# Patient Record
Sex: MALE | Race: WHITE | NOT HISPANIC OR LATINO | Employment: OTHER | URBAN - METROPOLITAN AREA
[De-identification: names, ages, dates, MRNs, and addresses within clinical notes are randomized per-mention and may not be internally consistent; named-entity substitution may affect disease eponyms.]

---

## 2017-12-01 ENCOUNTER — ALLIED HEALTH/NURSE VISIT (OUTPATIENT)
Dept: NURSING | Facility: CLINIC | Age: 72
End: 2017-12-01
Payer: MEDICARE

## 2017-12-01 VITALS
DIASTOLIC BLOOD PRESSURE: 100 MMHG | BODY MASS INDEX: 23.34 KG/M2 | SYSTOLIC BLOOD PRESSURE: 192 MMHG | WEIGHT: 163 LBS | HEIGHT: 70 IN

## 2017-12-01 DIAGNOSIS — Z53.9 ERRONEOUS ENCOUNTER--DISREGARD: Primary | ICD-10-CM

## 2019-11-04 ENCOUNTER — TELEPHONE (OUTPATIENT)
Dept: RADIATION ONCOLOGY | Facility: CLINIC | Age: 74
End: 2019-11-04

## 2019-11-04 NOTE — TELEPHONE ENCOUNTER
Radiation Oncology Intake: New Consult Screening        REFERRAL INFORMATION:   Scheduled by, Callback #: Patient    Diagnosis: Sarcoma R Thigh   Referring provider:  Self  (BMT) Protocol #:     Have you had previous Radiation Therapy with us?: No  Have you had previous Radiation Therapy elsewhere?: No  Is there a specific Radiation Oncologist requested?: No     Are you okay with traveling to our location daily to receive treatment(BMT: YES): Yes  Are there records pertaining to this diagnosis from external facilities?: Yes   Where: [facility]; Request for Pathology and Imaging Sent 10:53 AM 11/4/2019      Previous Radiation Therapy(Y/N; location):  no       Previous Chemotherap(Y/N; location): no     Biopsy(Y/N; location): yes, [facility]    Surgery(Y/N): Yes, [facility]        Imaging: Yes    Previous Testing/Procedures  Previous Genetic Counseling(Y/N; Location):   (Breast)Oncotype:    (Prostate)PSA:    (H&N)Dental:    PFT:     ADDITIONAL INFORMATION:  Special Needs:   Other:

## 2019-11-06 ENCOUNTER — OFFICE VISIT (OUTPATIENT)
Dept: RADIATION ONCOLOGY | Facility: CLINIC | Age: 74
End: 2019-11-06
Attending: RADIOLOGY
Payer: MEDICARE

## 2019-11-06 VITALS — BODY MASS INDEX: 23.68 KG/M2 | SYSTOLIC BLOOD PRESSURE: 181 MMHG | DIASTOLIC BLOOD PRESSURE: 89 MMHG | WEIGHT: 165 LBS

## 2019-11-06 DIAGNOSIS — C49.9 SARCOMA OF SOFT TISSUE (H): Primary | ICD-10-CM

## 2019-11-06 PROCEDURE — G0463 HOSPITAL OUTPT CLINIC VISIT: HCPCS | Performed by: RADIOLOGY

## 2019-11-06 ASSESSMENT — ENCOUNTER SYMPTOMS
EYE DISCHARGE: 0
INSOMNIA: 0
VOMITING: 0
EYE REDNESS: 0
PALPITATIONS: 0
NECK PAIN: 0
CONSTIPATION: 0
STRIDOR: 0
FREQUENCY: 1
NAUSEA: 0
COUGH: 0
BLURRED VISION: 0
BACK PAIN: 0
FEVER: 0
FOCAL WEAKNESS: 0
LOSS OF CONSCIOUSNESS: 0
ABDOMINAL PAIN: 0
DIZZINESS: 0
ORTHOPNEA: 0
DIAPHORESIS: 0
DIARRHEA: 0
WEIGHT LOSS: 0
SPEECH CHANGE: 0
TINGLING: 0
PHOTOPHOBIA: 0
BRUISES/BLEEDS EASILY: 0
WEAKNESS: 0
CHILLS: 0
DYSURIA: 0
DOUBLE VISION: 0
PND: 0
FLANK PAIN: 0
SHORTNESS OF BREATH: 1
HEMATURIA: 0
HEADACHES: 0
HALLUCINATIONS: 0
MEMORY LOSS: 0
DEPRESSION: 1
SEIZURES: 0
WHEEZING: 0
SENSORY CHANGE: 0
HEARTBURN: 0
HEMOPTYSIS: 0
POLYDIPSIA: 0
NERVOUS/ANXIOUS: 1
MYALGIAS: 0
FALLS: 0
SORE THROAT: 0
SPUTUM PRODUCTION: 0
BLOOD IN STOOL: 0
EYE PAIN: 0
TREMORS: 0
SINUS PAIN: 0
CLAUDICATION: 0

## 2019-11-06 ASSESSMENT — LIFESTYLE VARIABLES: SUBSTANCE_ABUSE: 0

## 2019-11-06 NOTE — LETTER
11/6/2019       RE: Carlo Pacheco  [address] [place] [address]     Dear Colleague,    Thank you for referring your patient, Carlo Pacheco, to the RAD[facility]ATION ONCOLOGY [facility]IC. Please see a copy of my visit note below.      HPI    INITIAL PATIENT ASSESSMENT    Diagnosis: Sarcoma    Prior radiation therapy: None    Prior chemotherapy: None    Prior hormonal therapy:No    Pain Eval:  Denies    Psychosocial  Living arrangements: Lives with wife  Fall Risk: independent   referral needs: Not needed    Advanced Directive: No  Implantable Cardiac Device? No      Nurse face-to-face time: Level 5:  over 15 min face to face time  Review of Systems   Constitutional: Positive for malaise/fatigue. Negative for chills, diaphoresis, fever and weight loss.   HENT: Negative for congestion, ear discharge, ear pain, hearing loss, nosebleeds, sinus pain, sore throat and tinnitus.    Eyes: Negative for blurred vision, double vision, photophobia, pain, discharge and redness.   Respiratory: Positive for shortness of breath. Negative for cough, hemoptysis, sputum production, wheezing and stridor.    Cardiovascular: Negative for chest pain, palpitations, orthopnea, claudication, leg swelling and PND.   Gastrointestinal: Negative for abdominal pain, blood in stool, constipation, diarrhea, heartburn, melena, nausea and vomiting.   Genitourinary: Positive for frequency. Negative for dysuria, flank pain, hematuria and urgency.   Musculoskeletal: Negative for back pain, falls, joint pain, myalgias and neck pain.   Skin: Negative for itching and rash.   Neurological: Negative for dizziness, tingling, tremors, sensory change, speech change, focal weakness, seizures, loss of consciousness, weakness and headaches.   Endo/Heme/Allergies: Negative for environmental allergies and polydipsia. Does not bruise/bleed easily.   Psychiatric/Behavioral: Positive for depression. Negative for hallucinations, memory loss, substance  abuse and suicidal ideas. The patient is nervous/anxious. The patient does not have insomnia.                  Radiation Oncology Consultation:  Date on this visit: 11/6/2019    Carlo Pacheco  is referred by Dr.Referred Vogt for a radiation oncology consultation. 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     History Of Present Illness:  Mr. 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 His relevant oncologic history dates back to approximately early 2019 when he noticed a lump developing on his right anterior thigh. The lump was not painful and did not cause him any limitations. He did notice the lump increasing in size and he had it evaluated in September. He underwent an ultrasound which suggested this was a lipoma and it was surgically removed on 10/1/2019 by Dr. Parekh through [facility]. Per the operative report, a 5 cm incision was made and during the course of surgical resection, it was noted that the mass did not appear consistent with a lipoma. The surgeon consulted a surgical oncologist and it was decided that send the specimen for pathology evaluation and not proceed with completing a full surgical excision until further information was obtained. The pathology revealed a myxofibrosarcoma, intermediate grade with positive margins and the specimen removed measured 7 x 8 x 2 cm. The patient had no further complications after surgery and had no wound healing issues. He has not noticed further changes in the surgical area and no new mass.     He underwent an MRI of the right thigh on 10/29/19 that demonstrated a well-circumscribed area of enhancement along the medial aspect of the vastus intermedius extending into the myofascial plane between the vastus intermedius and medialis. There is  an area of somewhat more cystic change within the superior aspect of the lesion, which does not demonstrate significant enhancement. In overall dimension, the lesion measures 2.5 x 1.4 x 5.3 cm. It abuts the periosteal margin of the femur without evidence for periosteal reaction or bone involvement.  There is an area of less well circumscribed and delineated signal abnormality within the subcutaneous soft tissues anterior to this region, which also demonstrates a few punctate foci of susceptibility artifact and may represent a site of old mass excision or biopsy. A portion of this is cystic and does not enhance and could represent old hematoma or fluid collection. This region measures 3.5 x 0.3 x 6.7 cm with the long axis parallel to the thigh.    He has seen Dr. Arreola at [facility] and discussed options for local control. He now presents today for discussion of radiation treatment options in the setting of his disease.       Past Medical/Surgical History:  Past Medical History:   Diagnosis Date     Anxiety      COPD (chronic obstructive pulmonary disease) (H)      Emphysema lung (H)      GERD (gastroesophageal reflux disease)      Hypertension      Lipoma      Prostate cancer (H)      Past Surgical History:   Procedure Laterality Date     HERNIA REPAIR         Past Radiation History: None (prostate cancer treated with prostatectomy and no adjuvant radiation)  Past Chemotherapy History: None  Implanted Cardiac device:   None    Review of Systems:  Reviewed.  See details in nursing note    Allergies:  Allergies as of 11/06/2019 - Reviewed 11/06/2019   Allergen Reaction Noted     Prilosec [omeprazole] GI Disturbance 11/06/2019     Penicillins Unknown and Other (See Comments) 12/10/2003     Zithromax [azithromycin] Hives 12/01/2017       Current Medications:  Current Outpatient Medications   Medication Sig Dispense Refill     LISINOPRIL PO Take 10 mg by mouth          Family History:  No family history of  cancer    Social History:  Social History     Tobacco Use     Smoking status: None   Substance Use Topics     Alcohol use: None     Drug use: None       Physical Exam:  BP (!) 181/89   Wt 74.8 kg (165 lb)   BMI 23.68 kg/m       GENERAL APPEARANCE: Healthy, alert and no distress  EYES: EOMS intact. Sclerae anicteric.   NECK: Supple. No asymmetry or masses  RESP: Lungs clear - no audible wheezes or rhonchi  CARDIOVASCULAR: Pulses regular rate and rhythm  ABDOMEN: Soft and nontender. No apparent masses  MUSCULOSKELETAL: Extremities normal. FROM in all extremities. No edema b/l LE. Well-healed surgical incision in the right anterior mid-thigh   SKIN: No suspicious lesions or rashes  NEURO: CN II-XII grossly intact   PSYCHIATRIC: Mentation appears normal and affect normal    Pathology:    Case Report Pathology Report                                  Case: [id number]                                   Authorizing Provider:  Killian Parekh     Collected:           10/01/2019 1052                                      MD Ramu                                                                     Ordering Location:     [facility]            Received:            10/01/2019 1213               Pathologist:           Falguni Alves MD                                                         Specimen:    Mass, RIGHT ANTERIOR THIGH MASS                                                       [facility]   Final Diagnosis SOFT TISSUE, RIGHT ANTERIOR THIGH, MASS, EXCISION:   Myxofibrosarcoma, intermediate grade, diffusely involving sample and extending to tissue edges  [facility]   Comment This case was sent in consultation to [facility] where Dr. Sumanth Noland reviewed the case and concurs with the interpretation ([id number]).     Dr. Falguni Alves discussed the final results with Dr. Killian Parekh on 10/15/19.  [facility]   Clinical Information Gelatinous, friable mass over the quadriceps muscle measuring 7 x 8 x 2 cm  [facility]CENTR[facility] LABOR[facility]   Gross Description A) Received in formalin, labeled with the patient's name and \"right anterior thigh mass,\" is a 6.5 x 5.0 x 2.0 cm morcellated aggregate of mucinous tan-pink, edematous tissue.  Representative sections are submitted in 7 cassettes.     [facility] 10/1/2019  [facility] LABORATORY   Microscopic Description The final diagnosis is based on microscopic examination of appropriate sections of all specimens.     Immunohistochemical stains were performed on block A3 and show the following results which support the above interpretation.   S-100: Negative   CD34: Negative   SOX-10: Negative   Cytokeratin Jim: Negative   Cytokeratin AE1/AE3: Negative   Cytokeratin 5/6: Negative   SMA: Negative   Caldesmon: Negative   Phillip-D1: Negative   Myogenin: Negative   INI-1: Retained   EMA: Negative   Ki-67: Approximately 5% of cells positive [facility] LABORATORY   Additional Information Interpreted at [facility]   (Central Lab, [facility], [facility],   [facility], [facility], [facility], [facility])           Laboratory/Imaging Studies    CT scan of the chest revealed no evidence of metastatic disease    EXAM: CT CHEST ABDOMEN PELVIS W  LOCATION: [facility] MEDICAL IMAGING  DATE/TIME: 10/29/2019 10:43 AM    INDICATION: Sarcoma (hc)  COMPARISON: 04/17/2018  TECHNIQUE: CT scan of the chest, abdomen, and pelvis was performed following  injection of IV contrast. Multiplanar reformats were obtained. Dose reduction  techniques were used.   CONTRAST: IOHEXOL 350 MG IODINE/ML  ML BOTTLE: 80mL    FINDINGS:   LUNGS AND PLEURA: Centrilobular and paraseptal emphysema. Hyperinflation of the  lungs. Mild peribronchial thickening. Scattered subsegmental atelectasis. No  pleural effusions. No " pneumothorax.    MEDIASTINUM/AXILLAE: Visualized thyroid gland is unremarkable. No thoracic  adenopathy. No cardiomegaly. No pericardial effusion. Normal caliber thoracic  aorta. Aortic atherosclerosis. Although not a dedicated PE study, no gross PE.  Persistent nonspecific circumferential distal esophageal mural thickening.    HEPATOBILIARY: In the liver are multiple low-density lesions, for example  measuring 9 mm on series 3, image 122. Most are too small to characterize  further.    PANCREAS: Normal.    SPLEEN: Normal.    ADRENAL GLANDS: Normal.    KIDNEYS/BLADDER: Scattered small probable cortical cysts. No stones or  hydronephrosis.    BOWEL: Stomach and small bowel unremarkable. No obstruction. The appendix is  visualized, and within normal limits. Sigmoid diverticulosis.    LYMPH NODES: No abdominopelvic lymphadenopathy. No ascites. No free air.    VASCULATURE: Aortic atherosclerosis. No abdominal aortic aneurysm.    PELVIC ORGANS: Urinary bladder is within normal limits. Hysterectomy.    MUSCULOSKELETAL: Normal.    OTHER: None.    IMPRESSION:  1.  Small low-density lesions in the liver are indeterminate in nature. While  they may be small cysts or hemangiomas, further evaluation with right upper  quadrant ultrasound is recommended.    2.  Persistent distal esophageal mural thickening may represent underlying    Outside MRI of right femur completed on 10/29/2019 at outside hospital, outside interpretation:   IMPRESSION:  1.  There is a fairly well-circumscribed area of abnormal enhancement along the  medial aspect of the vastus intermedius and likely extending into the myofascial  plane between the vastus intermedius and medialis. This demonstrates abnormal  enhancement and is consistent with a solid neoplastic process. A sarcoma could  have this appearance. A focus of metastatic disease could have this appearance.  There is an area of somewhat more cystic change within the superior aspect of  the lesion,  which does not demonstrate significant enhancement. In overall  dimension, the lesion measures 2.5 x 1.4 x 5.3 cm. It abuts the periosteal  margin of the femur without evidence for periosteal reaction or bone  involvement. The lesion is isointense to surrounding musculature on the T1  imaging and may be difficult to localize if biopsy is considered. Recommend  orthopedic oncologic consultation prior to any consideration of biopsy to avoid  contamination of tissue planes. PET scan may be helpful for further evaluation  if indicated. Otherwise, recommend follow-up examination in three months.  2.  There is an area of less well circumscribed and delineated signal  abnormality within the subcutaneous soft tissues anterior to this region, which  also demonstrates a few punctate foci of susceptibility artifact and may  represent a site of old mass excision or biopsy. Recommend correlation. A  portion of this is cystic and does not enhance and could represent old hematoma  or fluid collection. This region measures 3.5 x 0.3 x 6.7 cm with the long axis  parallel to the thigh.  3.  No additional lesions or soft tissue enhancement identified.  4.  No marrow signal abnormality.  5.  No knee or hip joint effusion.     ASSESSMENT:    Mr. Pacheco is a 73 year old man with a history of a recently diagnosed intermediate grade myxofibrosarcoma s/p unplanned excision in the right anterior thigh. He presents for discussion of radiation options in the management of his disease.      RECOMMENDATION:      We discussed with Mr. Pacheco and his daughters the natural history of sarcoma and our management recommendation for a combined approach including radiation and surgery with re-excision of the biopsy site and abnormalities noted on his MRI given his intermediate grade sarcoma. We discussed the role of radiation to significantly decrease the risk of a local recurrence in this site and while it is possible to deliver either preoperatively  and postoperatively, we recommend a course of preoperative radiation followed by surgery which he plans to undergo at [facility] with Dr. Arreola.     The risks and benefits of radiotherapy were discussed with the patient and his daughters. We spent time discussing the potential for wound healing delay when giving preoperative radiation although the advantage would be overall lower dose to less volume compared to postoperative treatment. We discussed other potential acute and long term side effects comprehensively and the patient and his daughters had an opportunity to ask questions answered to their apparent satisfaction. The patient agrees to proceed with radiation therapy. A written consent was obtained and we have scheduled a simulation for radiation planning.    Thank you for allowing me to participate in Carlo Pacheco's care.  Please do not hesitate to call me with questions.    Karen Son MD  Pager: [phone]    CC  SELF, REFERRED                  Again, thank you for allowing me to participate in the care of your patient.      Sincerely,    Karen Son MD

## 2019-11-06 NOTE — LETTER
Date:November 12, 2019      Patient was self referred, no letter generated. Do not send.        [facility] Information

## 2019-11-06 NOTE — PROGRESS NOTES
HPI    INITIAL PATIENT ASSESSMENT    Diagnosis: Sarcoma    Prior radiation therapy: None    Prior chemotherapy: None    Prior hormonal therapy:No    Pain Eval:  Denies    Psychosocial  Living arrangements: Lives with wife  Fall Risk: independent   referral needs: Not needed    Advanced Directive: No  Implantable Cardiac Device? No      Nurse face-to-face time: Level 5:  over 15 min face to face time  Review of Systems   Constitutional: Positive for malaise/fatigue. Negative for chills, diaphoresis, fever and weight loss.   HENT: Negative for congestion, ear discharge, ear pain, hearing loss, nosebleeds, sinus pain, sore throat and tinnitus.    Eyes: Negative for blurred vision, double vision, photophobia, pain, discharge and redness.   Respiratory: Positive for shortness of breath. Negative for cough, hemoptysis, sputum production, wheezing and stridor.    Cardiovascular: Negative for chest pain, palpitations, orthopnea, claudication, leg swelling and PND.   Gastrointestinal: Negative for abdominal pain, blood in stool, constipation, diarrhea, heartburn, melena, nausea and vomiting.   Genitourinary: Positive for frequency. Negative for dysuria, flank pain, hematuria and urgency.   Musculoskeletal: Negative for back pain, falls, joint pain, myalgias and neck pain.   Skin: Negative for itching and rash.   Neurological: Negative for dizziness, tingling, tremors, sensory change, speech change, focal weakness, seizures, loss of consciousness, weakness and headaches.   Endo/Heme/Allergies: Negative for environmental allergies and polydipsia. Does not bruise/bleed easily.   Psychiatric/Behavioral: Positive for depression. Negative for hallucinations, memory loss, substance abuse and suicidal ideas. The patient is nervous/anxious. The patient does not have insomnia.

## 2019-11-08 ENCOUNTER — TELEPHONE (OUTPATIENT)
Dept: RADIATION ONCOLOGY | Facility: CLINIC | Age: 74
End: 2019-11-08

## 2019-11-11 ENCOUNTER — ALLIED HEALTH/NURSE VISIT (OUTPATIENT)
Dept: RADIATION ONCOLOGY | Facility: CLINIC | Age: 74
End: 2019-11-11
Attending: RADIOLOGY
Payer: MEDICARE

## 2019-11-11 DIAGNOSIS — C49.9 SARCOMA OF SOFT TISSUE (H): Primary | ICD-10-CM

## 2019-11-11 PROCEDURE — 77334 RADIATION TREATMENT AID(S): CPT | Performed by: RADIOLOGY

## 2019-11-11 PROCEDURE — 00000346 ZZHCL STATISTIC REVIEW OUTSIDE SLIDES TC 88321: Performed by: RADIOLOGY

## 2019-11-11 NOTE — PROGRESS NOTES
Radiation Oncology Consultation:  Date on this visit: 11/6/2019    Carlo Pacheco  is referred by Dr.Referred Vogt for a radiation oncology consultation. 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     History Of Present Illness:  Mr. 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 His relevant oncologic history dates back to approximately early 2019 when he noticed a lump developing on his right anterior thigh. The lump was not painful and did not cause him any limitations. He did notice the lump increasing in size and he had it evaluated in September. He underwent an ultrasound which suggested this was a lipoma and it was surgically removed on 10/1/2019 by Dr. Parekh through [facility]. Per the operative report, a 5 cm incision was made and during the course of surgical resection, it was noted that the mass did not appear consistent with a lipoma. The surgeon consulted a surgical oncologist and it was decided that send the specimen for pathology evaluation and not proceed with completing a full surgical excision until further information was obtained. The pathology revealed a myxofibrosarcoma, intermediate grade with positive margins and the specimen removed measured 7 x 8 x 2 cm. The patient had no further complications after surgery and had no wound healing issues. He has not noticed further changes in the surgical area and no new mass.     He underwent an MRI of the right thigh on 10/29/19 that demonstrated a well-circumscribed area of enhancement along the medial aspect of the vastus intermedius extending into the myofascial plane between the vastus intermedius and medialis. There is an area of somewhat more cystic change within the superior aspect of the lesion, which does not demonstrate  significant enhancement. In overall dimension, the lesion measures 2.5 x 1.4 x 5.3 cm. It abuts the periosteal margin of the femur without evidence for periosteal reaction or bone involvement.  There is an area of less well circumscribed and delineated signal abnormality within the subcutaneous soft tissues anterior to this region, which also demonstrates a few punctate foci of susceptibility artifact and may represent a site of old mass excision or biopsy. A portion of this is cystic and does not enhance and could represent old hematoma or fluid collection. This region measures 3.5 x 0.3 x 6.7 cm with the long axis parallel to the thigh.    He has seen Dr. Arreola at [facility] and discussed options for local control. He now presents today for discussion of radiation treatment options in the setting of his disease.       Past Medical/Surgical History:  Past Medical History:   Diagnosis Date     Anxiety      COPD (chronic obstructive pulmonary disease) (H)      Emphysema lung (H)      GERD (gastroesophageal reflux disease)      Hypertension      Lipoma      Prostate cancer (H)      Past Surgical History:   Procedure Laterality Date     HERNIA REPAIR         Past Radiation History: None (prostate cancer treated with prostatectomy and no adjuvant radiation)  Past Chemotherapy History: None  Implanted Cardiac device:   None    Review of Systems:  Reviewed.  See details in nursing note    Allergies:  Allergies as of 11/06/2019 - Reviewed 11/06/2019   Allergen Reaction Noted     Prilosec [omeprazole] GI Disturbance 11/06/2019     Penicillins Unknown and Other (See Comments) 12/10/2003     Zithromax [azithromycin] Hives 12/01/2017       Current Medications:  Current Outpatient Medications   Medication Sig Dispense Refill     LISINOPRIL PO Take 10 mg by mouth          Family History:  No family history of cancer    Social History:  Social History     Tobacco Use     Smoking status: None   Substance Use Topics     Alcohol  use: None     Drug use: None       Physical Exam:  BP (!) 181/89   Wt 74.8 kg (165 lb)   BMI 23.68 kg/m      GENERAL APPEARANCE: Healthy, alert and no distress  EYES: EOMS intact. Sclerae anicteric.   NECK: Supple. No asymmetry or masses  RESP: Lungs clear - no audible wheezes or rhonchi  CARDIOVASCULAR: Pulses regular rate and rhythm  ABDOMEN: Soft and nontender. No apparent masses  MUSCULOSKELETAL: Extremities normal. FROM in all extremities. No edema b/l LE. Well-healed surgical incision in the right anterior mid-thigh   SKIN: No suspicious lesions or rashes  NEURO: CN II-XII grossly intact   PSYCHIATRIC: Mentation appears normal and affect normal    Pathology:    Case Report Pathology Report                                  Case: [id number]                                   Authorizing Provider:  Killian Parekh     Collected:           10/01/2019 105                                      MD Ramu                                                                     Ordering Location:     [facility]            Received:            10/01/2019 1213               Pathologist:           Falguni Alves MD                                                         Specimen:    Mass, RIGHT ANTERIOR THIGH MASS                                                       [facility]-CENTRAL LABORATORY   Final Diagnosis SOFT TISSUE, RIGHT ANTERIOR THIGH, MASS, EXCISION:   Myxofibrosarcoma, intermediate grade, diffusely involving sample and extending to tissue edges  [facility]-CENTRAL LABORAT[facility]   Comment This case was sent in consultation to [facility] where Dr. Sumanth Noland reviewed the case and concurs with the interpretation ([id number]).     Dr. Falguni Alves discussed the final results with Dr. Killian Parekh on 10/15/19.  [facility]-CENTRAL LABORATORY   Clinical Information Gelatinous, friable mass over the quadriceps muscle measuring 7 x 8 x 2 cm  [facility]  "[facility]-CENTRAL LABORATORY   Gross Description A) Received in formalin, labeled with the patient's name and \"right anterior thigh mass,\" is a 6.5 x 5.0 x 2.0 cm morcellated aggregate of mucinous tan-pink, edematous tissue.  Representative sections are submitted in 7 cassettes.     KJM 10/1/2019  [facility] LABORATORY   Microscopic Description The final diagnosis is based on microscopic examination of appropriate sections of all specimens.     Immunohistochemical stains were performed on block A3 and show the following results which support the above interpretation.   S-100: Negative   CD34: Negative   SOX-10: Negative   Cytokeratin Jim: Negative   Cytokeratin AE1/AE3: Negative   Cytokeratin 5/6: Negative   SMA: Negative   Caldesmon: Negative   Phillip-D1: Negative   Myogenin: Negative   INI-1: Retained   EMA: Negative   Ki-67: Approximately 5% of cells positive [facility]-CENTRAL LABORATORY   Additional Information Interpreted at [facility]   (Central Lab, [facility], [facility],   [facility], [facility], [facility], [facility])           Laboratory/Imaging Studies    CT scan of the chest revealed no evidence of metastatic disease    EXAM: CT CHEST ABDOMEN PELVIS W  LOCATION: [facility] MEDICAL IMAGING  DATE/TIME: 10/29/2019 10:43 AM    INDICATION: Sarcoma (hc)  COMPARISON: 04/17/2018  TECHNIQUE: CT scan of the chest, abdomen, and pelvis was performed following  injection of IV contrast. Multiplanar reformats were obtained. Dose reduction  techniques were used.   CONTRAST: IOHEXOL 350 MG IODINE/ML  ML BOTTLE: 80mL    FINDINGS:   LUNGS AND PLEURA: Centrilobular and paraseptal emphysema. Hyperinflation of the  lungs. Mild peribronchial thickening. Scattered subsegmental atelectasis. No  pleural effusions. No pneumothorax.    MEDIASTINUM/AXILLAE: Visualized thyroid gland is unremarkable. No thoracic  adenopathy. No cardiomegaly. No " pericardial effusion. Normal caliber thoracic  aorta. Aortic atherosclerosis. Although not a dedicated PE study, no gross PE.  Persistent nonspecific circumferential distal esophageal mural thickening.    HEPATOBILIARY: In the liver are multiple low-density lesions, for example  measuring 9 mm on series 3, image 122. Most are too small to characterize  further.    PANCREAS: Normal.    SPLEEN: Normal.    ADRENAL GLANDS: Normal.    KIDNEYS/BLADDER: Scattered small probable cortical cysts. No stones or  hydronephrosis.    BOWEL: Stomach and small bowel unremarkable. No obstruction. The appendix is  visualized, and within normal limits. Sigmoid diverticulosis.    LYMPH NODES: No abdominopelvic lymphadenopathy. No ascites. No free air.    VASCULATURE: Aortic atherosclerosis. No abdominal aortic aneurysm.    PELVIC ORGANS: Urinary bladder is within normal limits. Hysterectomy.    MUSCULOSKELETAL: Normal.    OTHER: None.    IMPRESSION:  1.  Small low-density lesions in the liver are indeterminate in nature. While  they may be small cysts or hemangiomas, further evaluation with right upper  quadrant ultrasound is recommended.    2.  Persistent distal esophageal mural thickening may represent underlying    Outside MRI of right femur completed on 10/29/2019 at outside hospital, outside interpretation:   IMPRESSION:  1.  There is a fairly well-circumscribed area of abnormal enhancement along the  medial aspect of the vastus intermedius and likely extending into the myofascial  plane between the vastus intermedius and medialis. This demonstrates abnormal  enhancement and is consistent with a solid neoplastic process. A sarcoma could  have this appearance. A focus of metastatic disease could have this appearance.  There is an area of somewhat more cystic change within the superior aspect of  the lesion, which does not demonstrate significant enhancement. In overall  dimension, the lesion measures 2.5 x 1.4 x 5.3 cm. It abuts the  periosteal  margin of the femur without evidence for periosteal reaction or bone  involvement. The lesion is isointense to surrounding musculature on the T1  imaging and may be difficult to localize if biopsy is considered. Recommend  orthopedic oncologic consultation prior to any consideration of biopsy to avoid  contamination of tissue planes. PET scan may be helpful for further evaluation  if indicated. Otherwise, recommend follow-up examination in three months.  2.  There is an area of less well circumscribed and delineated signal  abnormality within the subcutaneous soft tissues anterior to this region, which  also demonstrates a few punctate foci of susceptibility artifact and may  represent a site of old mass excision or biopsy. Recommend correlation. A  portion of this is cystic and does not enhance and could represent old hematoma  or fluid collection. This region measures 3.5 x 0.3 x 6.7 cm with the long axis  parallel to the thigh.  3.  No additional lesions or soft tissue enhancement identified.  4.  No marrow signal abnormality.  5.  No knee or hip joint effusion.     ASSESSMENT:    Mr. Pacheco is a 73 year old man with a history of a recently diagnosed intermediate grade myxofibrosarcoma s/p unplanned excision in the right anterior thigh. He presents for discussion of radiation options in the management of his disease.      RECOMMENDATION:      We discussed with Mr. Pacheco and his daughters the natural history of sarcoma and our management recommendation for a combined approach including radiation and surgery with re-excision of the biopsy site and abnormalities noted on his MRI given his intermediate grade sarcoma. We discussed the role of radiation to significantly decrease the risk of a local recurrence in this site and while it is possible to deliver either preoperatively and postoperatively, we recommend a course of preoperative radiation followed by surgery which he plans to undergo at [facility] with  Dr. Arreola.     The risks and benefits of radiotherapy were discussed with the patient and his daughters. We spent time discussing the potential for wound healing delay when giving preoperative radiation although the advantage would be overall lower dose to less volume compared to postoperative treatment. We discussed other potential acute and long term side effects comprehensively and the patient and his daughters had an opportunity to ask questions answered to their apparent satisfaction. The patient agrees to proceed with radiation therapy. A written consent was obtained and we have scheduled a simulation for radiation planning.    Thank you for allowing me to participate in Carlo Pacheco's care.  Please do not hesitate to call me with questions.    Karen Son MD  Pager: [phone]    CC  SELF, REFERRED

## 2019-11-11 NOTE — NURSING NOTE
Radiation Therapy Patient Education    Person involved with teaching: Patient and Wife    Patient educational needs for self management of treatment-related side effects assessment completed.  EPIC Patient Ed tab contains Patient Learning Assessment    Education Materials Given  Skin Care During Radiation Treatment and sim pamphlet    Educational Topics Discussed  Side effects expected, Pain management, Skin care, Activity, Nutrition and weight loss and When to call MD/RN    Response To Teaching  Verbalizes understanding    Referrals sent: None    Chemotherapy?  No

## 2019-11-13 LAB — COPATH REPORT: NORMAL

## 2019-11-19 ENCOUNTER — OFFICE VISIT (OUTPATIENT)
Dept: RADIATION ONCOLOGY | Facility: CLINIC | Age: 74
End: 2019-11-19
Attending: RADIOLOGY
Payer: MEDICARE

## 2019-11-19 VITALS
HEART RATE: 82 BPM | BODY MASS INDEX: 23.68 KG/M2 | DIASTOLIC BLOOD PRESSURE: 112 MMHG | SYSTOLIC BLOOD PRESSURE: 179 MMHG | WEIGHT: 165 LBS

## 2019-11-19 DIAGNOSIS — C49.9 SARCOMA OF SOFT TISSUE (H): Primary | ICD-10-CM

## 2019-11-19 PROCEDURE — 77386 ZZH IMRT TREATMENT DELIVERY, COMPLEX: CPT | Performed by: RADIOLOGY

## 2019-11-19 RX ORDER — TIMOLOL MALEATE 5 MG/ML
SOLUTION/ DROPS OPHTHALMIC
COMMUNITY
Start: 2019-09-19 | End: 2023-11-28

## 2019-11-19 RX ORDER — SERTRALINE HYDROCHLORIDE 25 MG/1
TABLET, FILM COATED ORAL
COMMUNITY
Start: 2019-11-01 | End: 2021-03-16

## 2019-11-19 RX ORDER — LISINOPRIL AND HYDROCHLOROTHIAZIDE 12.5; 2 MG/1; MG/1
1 TABLET ORAL
COMMUNITY
Start: 2019-10-18 | End: 2021-03-16

## 2019-11-19 RX ORDER — ZINC GLUCONATE 50 MG
50 TABLET ORAL
COMMUNITY
End: 2023-11-28

## 2019-11-19 RX ORDER — RIBOFLAVIN (VITAMIN B2) 100 MG
1000 TABLET ORAL
COMMUNITY

## 2019-11-19 NOTE — LETTER
2019       RE: Carlo Pacheco  [address] [place] [address]     Dear Colleague,    Thank you for referring your patient, Carlo Pacheco, to the RADIATION ONCOLOGY CLINIC. Please see a copy of my visit note below.    [facility] PHYSICIANS  SPECIALIZING IN BREAKTHROUGHS  Radiation Oncology    On Treatment Visit Note      Carlo Pacheco      Date: 2019   MRN: [id number]   : 1945  Diagnosis: Myoxofibrosarcoma    Treatment Summary to Date  Treatment Site: right leg Current Dose: 200/5000 cGy Fractions:       Chemotherapy  Chemo concurrent with radx?: No    Subjective: Mr. Pacheco just initiated radiation and has no new symptoms report.     Nursing ROS:   Nutrition Alteration  Diet Type: Patient's Preference  Skin  Skin Reaction: 0 - No changes     Pain Assessment  0-10 Pain Scale: 0    Objective:   BP (!) 179/112   Pulse 82   Wt 74.8 kg (165 lb)   BMI 23.68 kg/m     Gen: Appears well, NAD  Skin: No erythema    Assessment:    Tolerating radiation therapy well.  All questions and concerns addressed.    Plan:   1. Continue current therapy  2. We discussed that we would order a liver ultrasound for further evaluation of findings on his staging workup performed by his outside physicians.     [employer] chart and setup information reviewed. Imaging reviewed.     Karen Son MD  Pager: [phone]          Again, thank you for allowing me to participate in the care of your patient.      Sincerely,    Karen Son MD

## 2019-11-19 NOTE — LETTER
Date:November 26, 2019      Patient was self referred, no letter generated. Do not send.        [facility] Health Information

## 2019-11-21 ENCOUNTER — APPOINTMENT (OUTPATIENT)
Dept: RADIATION ONCOLOGY | Facility: CLINIC | Age: 74
End: 2019-11-21
Attending: RADIOLOGY
Payer: MEDICARE

## 2019-11-21 PROCEDURE — 77386 ZZH IMRT TREATMENT DELIVERY, COMPLEX: CPT | Performed by: RADIOLOGY

## 2019-11-22 ENCOUNTER — APPOINTMENT (OUTPATIENT)
Dept: RADIATION ONCOLOGY | Facility: CLINIC | Age: 74
End: 2019-11-22
Attending: RADIOLOGY
Payer: MEDICARE

## 2019-11-22 PROCEDURE — 77386 ZZH IMRT TREATMENT DELIVERY, COMPLEX: CPT | Performed by: RADIOLOGY

## 2019-11-25 ENCOUNTER — APPOINTMENT (OUTPATIENT)
Dept: RADIATION ONCOLOGY | Facility: CLINIC | Age: 74
End: 2019-11-25
Attending: RADIOLOGY
Payer: MEDICARE

## 2019-11-25 DIAGNOSIS — C49.9 SARCOMA OF SOFT TISSUE (H): Primary | ICD-10-CM

## 2019-11-25 PROCEDURE — 77386 ZZH IMRT TREATMENT DELIVERY, COMPLEX: CPT | Performed by: RADIOLOGY

## 2019-11-25 NOTE — PROGRESS NOTES
[facility] PHYSICIANS  SPECIALIZING IN BREAKTHROUGHS  Radiation Oncology    On Treatment Visit Note      Carlo Pacheco      Date: 2019   MRN: [id number]   : 1945  Diagnosis: Myoxofibrosarcoma    Treatment Summary to Date  Treatment Site: right leg Current Dose: 200/5000 cGy Fractions:       Chemotherapy  Chemo concurrent with radx?: No    Subjective: Mr. Pacheco just initiated radiation and has no new symptoms report.     Nursing ROS:   Nutrition Alteration  Diet Type: Patient's Preference  Skin  Skin Reaction: 0 - No changes     Pain Assessment  0-10 Pain Scale: 0    Objective:   BP (!) 179/112   Pulse 82   Wt 74.8 kg (165 lb)   BMI 23.68 kg/m    Gen: Appears well, NAD  Skin: No erythema    Assessment:    Tolerating radiation therapy well.  All questions and concerns addressed.    Plan:   1. Continue current therapy  2. We discussed that we would order a liver ultrasound for further evaluation of findings on his staging workup performed by his outside physicians.     [employer] chart and setup information reviewed. Imaging reviewed.     Karen Son MD  Pager: [phone]

## 2019-11-26 ENCOUNTER — APPOINTMENT (OUTPATIENT)
Dept: RADIATION ONCOLOGY | Facility: CLINIC | Age: 74
End: 2019-11-26
Attending: RADIOLOGY
Payer: MEDICARE

## 2019-11-26 VITALS
DIASTOLIC BLOOD PRESSURE: 89 MMHG | SYSTOLIC BLOOD PRESSURE: 180 MMHG | HEART RATE: 90 BPM | BODY MASS INDEX: 22.67 KG/M2 | WEIGHT: 158 LBS

## 2019-11-26 DIAGNOSIS — C49.9 SARCOMA OF SOFT TISSUE (H): Primary | ICD-10-CM

## 2019-11-26 PROCEDURE — 77386 ZZH IMRT TREATMENT DELIVERY, COMPLEX: CPT | Performed by: RADIOLOGY

## 2019-11-26 PROCEDURE — 77336 RADIATION PHYSICS CONSULT: CPT | Performed by: RADIOLOGY

## 2019-11-26 NOTE — LETTER
2019       RE: Carlo Pacheco  [address] [place] [address]     Dear Colleague,    Thank you for referring your patient, Carlo Pacheco, to the RADIATION ONCOLOGY CLINIC. Please see a copy of my visit note below.    [facility] PHYSICIANS  SPECIALIZING IN BREAKTHROUGHS  Radiation Oncology    On Treatment Visit Note      Carlo Pacheco      Date: 2019   MRN: [id number]   : 1945  Diagnosis: Myoxofibrosarcoma    Treatment Summary to Date  Treatment Site: right leg Current Dose: 2100/5000 cGy Fractions:       Chemotherapy  Chemo concurrent with radx?: No    Subjective: Mr. Pacheco is symptomatically well with no areas of erythema or pain.     Nursing ROS:   Nutrition Alteration  Diet Type: Patient's Preference  Skin  Skin Reaction: 0 - No changes    Pain Assessment  0-10 Pain Scale: 0    Objective:   BP (!) 180/89   Pulse 90   Wt 71.7 kg (158 lb)   BMI 22.67 kg/m     Gen: Appears well, NAD  Skin: No erythema    Assessment:    Tolerating radiation therapy well.  All questions and concerns addressed.    Plan:   1. Continue current therapy    [facility] chart and setup information reviewed. Imaging reviewed.     Karen Son MD  Pager: [phone]          Again, thank you for allowing me to participate in the care of your patient.      Sincerely,    Karen Son MD

## 2019-11-26 NOTE — LETTER
Date:December 3, 2019      Patient was self referred, no letter generated. Do not send.        [facility] Physicians Health Information

## 2019-11-27 ENCOUNTER — APPOINTMENT (OUTPATIENT)
Dept: RADIATION ONCOLOGY | Facility: CLINIC | Age: 74
End: 2019-11-27
Attending: RADIOLOGY
Payer: MEDICARE

## 2019-11-27 PROCEDURE — 77386 ZZH IMRT TREATMENT DELIVERY, COMPLEX: CPT | Performed by: RADIOLOGY

## 2019-11-29 ENCOUNTER — HOSPITAL ENCOUNTER (OUTPATIENT)
Dept: ULTRASOUND IMAGING | Facility: CLINIC | Age: 74
Discharge: HOME | End: 2019-11-29
Attending: RADIOLOGY | Admitting: RADIOLOGY
Payer: MEDICARE

## 2019-11-29 ENCOUNTER — APPOINTMENT (OUTPATIENT)
Dept: RADIATION ONCOLOGY | Facility: CLINIC | Age: 74
End: 2019-11-29
Attending: RADIOLOGY
Payer: MEDICARE

## 2019-11-29 DIAGNOSIS — C49.9 SARCOMA OF SOFT TISSUE (H): ICD-10-CM

## 2019-11-29 PROCEDURE — 77386 ZZH IMRT TREATMENT DELIVERY, COMPLEX: CPT | Performed by: RADIOLOGY

## 2019-11-29 PROCEDURE — 76705 ECHO EXAM OF ABDOMEN: CPT | Mod: XU

## 2019-12-02 ENCOUNTER — OFFICE VISIT (OUTPATIENT)
Dept: RADIATION ONCOLOGY | Facility: CLINIC | Age: 74
End: 2019-12-02
Attending: RADIOLOGY
Payer: MEDICARE

## 2019-12-02 VITALS
BODY MASS INDEX: 22.74 KG/M2 | SYSTOLIC BLOOD PRESSURE: 176 MMHG | DIASTOLIC BLOOD PRESSURE: 89 MMHG | WEIGHT: 158.5 LBS | HEART RATE: 88 BPM

## 2019-12-02 DIAGNOSIS — C49.9 SARCOMA OF SOFT TISSUE (H): Primary | ICD-10-CM

## 2019-12-02 PROCEDURE — 77386 ZZH IMRT TREATMENT DELIVERY, COMPLEX: CPT | Performed by: RADIOLOGY

## 2019-12-02 NOTE — PROGRESS NOTES
[facility] PHYSICIANS  SPECIALIZING IN BREAKTHROUGHS  Radiation Oncology    On Treatment Visit Note      Calro Pacheco      Date: 2019   MRN: [id number]   : 1945  Diagnosis: Myoxofibrosarcoma    Treatment Summary to Date  Treatment Site: right leg Current Dose: 2100/5000 cGy Fractions:       Chemotherapy  Chemo concurrent with radx?: No    Subjective: Mr. Pacheco is symptomatically well with no areas of erythema or pain.     Nursing ROS:   Nutrition Alteration  Diet Type: Patient's Preference  Skin  Skin Reaction: 0 - No changes    Pain Assessment  0-10 Pain Scale: 0    Objective:   BP (!) 180/89   Pulse 90   Wt 71.7 kg (158 lb)   BMI 22.67 kg/m    Gen: Appears well, NAD  Skin: No erythema    Assessment:    Tolerating radiation therapy well.  All questions and concerns addressed.    Plan:   1. Continue current therapy    [facility] chart and setup information reviewed. Imaging reviewed.     Karen Son MD  Pager: [phone]

## 2019-12-02 NOTE — LETTER
2019       RE: Carlo Pacheco  [address] [place] [address]     Dear Colleague,    Thank you for referring your patient, Carlo Pacheco, to the RADIATION ONCOLOGY CLINIC. Please see a copy of my visit note below.    [facility] PHYSICIANS  SPECIALIZING IN BREAKTHROUGHS  Radiation Oncology    On Treatment Visit Note      Carlo Pacheco      Date: 2019   MRN: [id number]   : 1945  Diagnosis: Myoxofibrosarcoma    Treatment Summary to Date  Treatment Site: right leg Current Dose: 1600/5000 cGy Fractions:       Chemotherapy  Chemo concurrent with radx?: No    Subjective: Mr. Pacheco is symptomatically well this week. He reports no new pain, swelling, or skin changes.     Nursing ROS:   Nutrition Alteration  Diet Type: Patient's Preference  Skin  Skin Reaction: 0 - No changes     Pain Assessment  0-10 Pain Scale: 0    Objective:   BP (!) 176/89   Pulse 88   Wt 71.9 kg (158 lb 8 oz)   BMI 22.74 kg/m     Gen: Appears well, NAD  Skin: No erythema    Assessment:    Tolerating radiation therapy well.  All questions and concerns addressed.    Plan:   1. Continue current therapy  2. We discussed the results of his abdominal ultrasound which was performed as a result of a recommendation from radiology after his PET scan. The ultrasound reported no suspicious or concerning lesions.     [facility] chart and setup information reviewed. Imaging reviewed.     Karen Son MD  Pager: [phone]          Again, thank you for allowing me to participate in the care of your patient.      Sincerely,    Karen Son MD

## 2019-12-02 NOTE — LETTER
Date:December 6, 2019      Patient was self referred, no letter generated. Do not send.        [facility] Physicians Health Information

## 2019-12-03 ENCOUNTER — APPOINTMENT (OUTPATIENT)
Dept: RADIATION ONCOLOGY | Facility: CLINIC | Age: 74
End: 2019-12-03
Attending: RADIOLOGY
Payer: MEDICARE

## 2019-12-03 PROCEDURE — 77386 ZZH IMRT TREATMENT DELIVERY, COMPLEX: CPT | Performed by: RADIOLOGY

## 2019-12-04 ENCOUNTER — APPOINTMENT (OUTPATIENT)
Dept: RADIATION ONCOLOGY | Facility: CLINIC | Age: 74
End: 2019-12-04
Attending: RADIOLOGY
Payer: MEDICARE

## 2019-12-04 PROCEDURE — 77336 RADIATION PHYSICS CONSULT: CPT | Performed by: RADIOLOGY

## 2019-12-04 PROCEDURE — 77386 ZZH IMRT TREATMENT DELIVERY, COMPLEX: CPT | Performed by: RADIOLOGY

## 2019-12-05 ENCOUNTER — APPOINTMENT (OUTPATIENT)
Dept: RADIATION ONCOLOGY | Facility: CLINIC | Age: 74
End: 2019-12-05
Attending: RADIOLOGY
Payer: MEDICARE

## 2019-12-05 PROCEDURE — 77386 ZZH IMRT TREATMENT DELIVERY, COMPLEX: CPT | Performed by: RADIOLOGY

## 2019-12-05 NOTE — PROGRESS NOTES
[facility] PHYSICIANS  SPECIALIZING IN BREAKTHROUGHS  Radiation Oncology    On Treatment Visit Note      Carlo Pacheco      Date: 2019   MRN: [id number]   : 1945  Diagnosis: Myoxofibrosarcoma    Treatment Summary to Date  Treatment Site: right leg Current Dose: 1600/5000 cGy Fractions:       Chemotherapy  Chemo concurrent with radx?: No    Subjective: Mr. Pacheco is symptomatically well this week. He reports no new pain, swelling, or skin changes.     Nursing ROS:   Nutrition Alteration  Diet Type: Patient's Preference  Skin  Skin Reaction: 0 - No changes     Pain Assessment  0-10 Pain Scale: 0    Objective:   BP (!) 176/89   Pulse 88   Wt 71.9 kg (158 lb 8 oz)   BMI 22.74 kg/m    Gen: Appears well, NAD  Skin: No erythema    Assessment:    Tolerating radiation therapy well.  All questions and concerns addressed.    Plan:   1. Continue current therapy  2. We discussed the results of his abdominal ultrasound which was performed as a result of a recommendation from radiology after his PET scan. The ultrasound reported no suspicious or concerning lesions.     [facility]. Imaging reviewed.     Karen Son MD  Pager: [phone]

## 2019-12-06 ENCOUNTER — APPOINTMENT (OUTPATIENT)
Dept: RADIATION ONCOLOGY | Facility: CLINIC | Age: 74
End: 2019-12-06
Attending: RADIOLOGY
Payer: MEDICARE

## 2019-12-06 PROCEDURE — 77386 ZZH IMRT TREATMENT DELIVERY, COMPLEX: CPT | Performed by: RADIOLOGY

## 2019-12-09 ENCOUNTER — APPOINTMENT (OUTPATIENT)
Dept: RADIATION ONCOLOGY | Facility: CLINIC | Age: 74
End: 2019-12-09
Attending: RADIOLOGY
Payer: MEDICARE

## 2019-12-09 VITALS — DIASTOLIC BLOOD PRESSURE: 97 MMHG | HEART RATE: 76 BPM | OXYGEN SATURATION: 94 % | SYSTOLIC BLOOD PRESSURE: 167 MMHG

## 2019-12-09 DIAGNOSIS — C49.9 SARCOMA OF SOFT TISSUE (H): Primary | ICD-10-CM

## 2019-12-09 PROCEDURE — 77386 ZZH IMRT TREATMENT DELIVERY, COMPLEX: CPT | Performed by: RADIOLOGY

## 2019-12-09 NOTE — LETTER
2019       RE: Carlo Pacheco  [address] [place] [address]     Dear Colleague,    Thank you for referring your patient, Carlo Pacheco, to the RADIATION ONCOLOGY CLINIC. Please see a copy of my visit note below.    [facility] PHYSICIANS  SPECIALIZING IN BREAKTHROUGHS  Radiation Oncology    On Treatment Visit Note      Carlo Pacheco      Date: 2019   MRN: [id number]   : 1945  Diagnosis: Myoxofibrosarcoma    Treatment Summary to Date  Treatment Site: right leg Current Dose: 2600/5000 cGy Fractions:       Chemotherapy  Chemo concurrent with radx?: No    Subjective: Mr. Pacheco is feeling symptomatically well this week. He has no skin changes, pain, or swelling in the operative site.     Nursing ROS:   Nutrition Alteration  Diet Type: Patient's Preference  Skin  Skin Reaction: 0 - No changes     Gastrointestinal  GI Note: WNL     Psychosocial  Pyschosocial Note: Feeling well  Pain Assessment  0-10 Pain Scale: 0    Objective:   BP (!) 167/97   Pulse 76   SpO2 94%   Gen: Appears well, NAD  Skin: No erythema    Assessment:    Tolerating radiation therapy well.  All questions and concerns addressed.    Plan:   1. Continue current therapy    [facility] chart and setup information reviewed. Imaging reviewed.     Karen Son MD  Pager: [phone]          Again, thank you for allowing me to participate in the care of your patient.      Sincerely,    Karen Son MD

## 2019-12-09 NOTE — LETTER
Date:December 17, 2019      Patient was self referred, no letter generated. Do not send.        [facility] Physicians Health Information

## 2019-12-10 ENCOUNTER — APPOINTMENT (OUTPATIENT)
Dept: RADIATION ONCOLOGY | Facility: CLINIC | Age: 74
End: 2019-12-10
Attending: RADIOLOGY
Payer: MEDICARE

## 2019-12-10 PROCEDURE — 77386 ZZH IMRT TREATMENT DELIVERY, COMPLEX: CPT | Performed by: RADIOLOGY

## 2019-12-11 ENCOUNTER — APPOINTMENT (OUTPATIENT)
Dept: RADIATION ONCOLOGY | Facility: CLINIC | Age: 74
End: 2019-12-11
Attending: RADIOLOGY
Payer: MEDICARE

## 2019-12-11 PROCEDURE — 77386 ZZH IMRT TREATMENT DELIVERY, COMPLEX: CPT | Performed by: RADIOLOGY

## 2019-12-11 PROCEDURE — 77336 RADIATION PHYSICS CONSULT: CPT | Performed by: RADIOLOGY

## 2019-12-12 ENCOUNTER — APPOINTMENT (OUTPATIENT)
Dept: RADIATION ONCOLOGY | Facility: CLINIC | Age: 74
End: 2019-12-12
Attending: RADIOLOGY
Payer: MEDICARE

## 2019-12-12 PROCEDURE — 77386 ZZH IMRT TREATMENT DELIVERY, COMPLEX: CPT | Performed by: RADIOLOGY

## 2019-12-13 ENCOUNTER — APPOINTMENT (OUTPATIENT)
Dept: RADIATION ONCOLOGY | Facility: CLINIC | Age: 74
End: 2019-12-13
Attending: RADIOLOGY
Payer: MEDICARE

## 2019-12-13 PROCEDURE — 77386 ZZH IMRT TREATMENT DELIVERY, COMPLEX: CPT | Performed by: RADIOLOGY

## 2019-12-16 ENCOUNTER — APPOINTMENT (OUTPATIENT)
Dept: RADIATION ONCOLOGY | Facility: CLINIC | Age: 74
End: 2019-12-16
Attending: RADIOLOGY
Payer: MEDICARE

## 2019-12-16 VITALS
HEART RATE: 72 BPM | WEIGHT: 160 LBS | SYSTOLIC BLOOD PRESSURE: 158 MMHG | DIASTOLIC BLOOD PRESSURE: 88 MMHG | BODY MASS INDEX: 22.96 KG/M2

## 2019-12-16 DIAGNOSIS — C49.9 SARCOMA OF SOFT TISSUE (H): Primary | ICD-10-CM

## 2019-12-16 PROCEDURE — 77386 ZZH IMRT TREATMENT DELIVERY, COMPLEX: CPT | Performed by: RADIOLOGY

## 2019-12-16 NOTE — LETTER
Date:December 26, 2019      Patient was self referred, no letter generated. Do not send.        [facility] Physicians Health Information

## 2019-12-16 NOTE — PROGRESS NOTES
[facility] PHYSICIANS  SPECIALIZING IN BREAKTHROUGHS  Radiation Oncology    On Treatment Visit Note      Carlo Pacheco      Date: 2019   MRN: [id number]   : 1945  Diagnosis: Myoxofibrosarcoma    Treatment Summary to Date  Treatment Site: right leg Current Dose: 2600/5000 cGy Fractions:       Chemotherapy  Chemo concurrent with radx?: No    Subjective: Mr. Pacheco is feeling symptomatically well this week. He has no skin changes, pain, or swelling in the operative site.     Nursing ROS:   Nutrition Alteration  Diet Type: Patient's Preference  Skin  Skin Reaction: 0 - No changes     Gastrointestinal  GI Note: WNL     Psychosocial  Pyschosocial Note: Feeling well  Pain Assessment  0-10 Pain Scale: 0    Objective:   BP (!) 167/97   Pulse 76   SpO2 94%   Gen: Appears well, NAD  Skin: No erythema    Assessment:    Tolerating radiation therapy well.  All questions and concerns addressed.    Plan:   1. Continue current therapy    Mosaiq chart and setup information reviewed. Imaging reviewed.     Karen Son MD  Pager: [phone]

## 2019-12-16 NOTE — LETTER
2019       RE: Carlo Pacheco  [address] [place] [address]     Dear Colleague,    Thank you for referring your patient, Carlo Pacheco, to the RADIATION ONCOLOGY CLINIC. Please see a copy of my visit note below.    [facility] PHYSICIANS  SPECIALIZING IN BREAKTHROUGHS  Radiation Oncology    On Treatment Visit Note      Carlo Pacheco      Date: 2019   MRN: [id number]   : 1945  Diagnosis: Myxofibrosarcoma    Treatment Summary to Date  Treatment Site: right leg Current Dose: 3600/5000 cGy Fractions:       Chemotherapy  Chemo concurrent with radx?: No    Subjective: Mr. Pacheco feels symptomatically well. He has no new pain or limitations in the range of motion.     Nursing ROS:   Nutrition Alteration  Diet Type: Patient's Preference  Skin  Skin Reaction: 0 - No changes     Gastrointestinal  GI Note: WNL     Psychosocial  Pyschosocial Note: Feeling well  Pain Assessment  0-10 Pain Scale: 0    Objective:   BP (!) 158/88   Pulse 72   Wt 72.6 kg (160 lb)   BMI 22.96 kg/m     Gen: Appears well, NAD  Skin: No erythema    Assessment:    Tolerating radiation therapy well.  All questions and concerns addressed.    Plan:   1. Continue current therapy    Mosaiq chart and setup information reviewed. Imaging reviewed.     Karen Son MD  Pager: [phone]          Again, thank you for allowing me to participate in the care of your patient.      Sincerely,    Karen Son MD

## 2019-12-17 ENCOUNTER — APPOINTMENT (OUTPATIENT)
Dept: RADIATION ONCOLOGY | Facility: CLINIC | Age: 74
End: 2019-12-17
Attending: RADIOLOGY
Payer: MEDICARE

## 2019-12-17 PROCEDURE — 77386 ZZH IMRT TREATMENT DELIVERY, COMPLEX: CPT | Performed by: RADIOLOGY

## 2019-12-18 ENCOUNTER — APPOINTMENT (OUTPATIENT)
Dept: RADIATION ONCOLOGY | Facility: CLINIC | Age: 74
End: 2019-12-18
Attending: RADIOLOGY
Payer: MEDICARE

## 2019-12-18 PROCEDURE — 77386 ZZH IMRT TREATMENT DELIVERY, COMPLEX: CPT | Performed by: RADIOLOGY

## 2019-12-18 PROCEDURE — 77336 RADIATION PHYSICS CONSULT: CPT | Performed by: RADIOLOGY

## 2019-12-19 ENCOUNTER — APPOINTMENT (OUTPATIENT)
Dept: RADIATION ONCOLOGY | Facility: CLINIC | Age: 74
End: 2019-12-19
Attending: RADIOLOGY
Payer: MEDICARE

## 2019-12-19 PROCEDURE — 77386 ZZH IMRT TREATMENT DELIVERY, COMPLEX: CPT | Performed by: RADIOLOGY

## 2019-12-20 ENCOUNTER — APPOINTMENT (OUTPATIENT)
Dept: RADIATION ONCOLOGY | Facility: CLINIC | Age: 74
End: 2019-12-20
Attending: RADIOLOGY
Payer: MEDICARE

## 2019-12-20 PROCEDURE — 77386 ZZH IMRT TREATMENT DELIVERY, COMPLEX: CPT | Performed by: RADIOLOGY

## 2019-12-23 ENCOUNTER — APPOINTMENT (OUTPATIENT)
Dept: RADIATION ONCOLOGY | Facility: CLINIC | Age: 74
End: 2019-12-23
Attending: RADIOLOGY
Payer: MEDICARE

## 2019-12-23 VITALS
BODY MASS INDEX: 23.1 KG/M2 | DIASTOLIC BLOOD PRESSURE: 108 MMHG | HEART RATE: 68 BPM | WEIGHT: 161 LBS | SYSTOLIC BLOOD PRESSURE: 170 MMHG

## 2019-12-23 DIAGNOSIS — C49.9 SARCOMA OF SOFT TISSUE (H): Primary | ICD-10-CM

## 2019-12-23 PROCEDURE — 77386 ZZH IMRT TREATMENT DELIVERY, COMPLEX: CPT | Performed by: RADIOLOGY

## 2019-12-23 NOTE — PROGRESS NOTES
[facility] PHYSICIANS  SPECIALIZING IN BREAKTHROUGHS  Radiation Oncology    On Treatment Visit Note      Carlo Pacheco      Date: 2019   MRN: [id number]   : 1945  Diagnosis: Myxofibrosarcoma    Treatment Summary to Date  Treatment Site: right leg Current Dose: 3600/5000 cGy Fractions:       Chemotherapy  Chemo concurrent with radx?: No    Subjective: Mr. Pacheco feels symptomatically well. He has no new pain or limitations in the range of motion.     Nursing ROS:   Nutrition Alteration  Diet Type: Patient's Preference  Skin  Skin Reaction: 0 - No changes     Gastrointestinal  GI Note: WNL     Psychosocial  Pyschosocial Note: Feeling well  Pain Assessment  0-10 Pain Scale: 0    Objective:   BP (!) 158/88   Pulse 72   Wt 72.6 kg (160 lb)   BMI 22.96 kg/m    Gen: Appears well, NAD  Skin: No erythema    Assessment:    Tolerating radiation therapy well.  All questions and concerns addressed.    Plan:   1. Continue current therapy    Mosaiq chart and setup information reviewed. Imaging reviewed.     Karen Son MD  Pager: [phone]

## 2019-12-23 NOTE — LETTER
2019       RE: Carlo Pacheco  [address] [place] [address]     Dear Colleague,    Thank you for referring your patient, Carlo Pacheco, to the RADIATION ONCOLOGY CLINIC. Please see a copy of my visit note below.    [facility] PHYSICIANS  SPECIALIZING IN BREAKTHROUGHS  Radiation Oncology    On Treatment Visit Note      Carlo Pacheco      Date: 2019   MRN: [id number]   : 1945  Diagnosis: Myoxofibrosarcoma    Treatment Summary to Date  Treatment Site: Right leg Current Dose: 4600/5000 cGy Fractions:       Chemotherapy  Chemo concurrent with radx?: No    Subjective: Mr. Pacheco is symptomatically well this week with no changes. He has no pain, extremity weakness, numbness or tingling. He reports no skin changes.     Nursing ROS:   Nutrition Alteration  Diet Type: Patient's Preference  Skin  Skin Reaction: 0 - No changes           Gastrointestinal  GI Note: WNL     Psychosocial  Pyschosocial Note: Feeling well  Pain Assessment  0-10 Pain Scale: 0    Objective:   BP (!) 170/108   Pulse 68   Wt 73 kg (161 lb)   BMI 23.10 kg/m     Gen: Appears well, NAD  Skin: No erythema    Assessment:    Tolerating radiation therapy well.  All questions and concerns addressed.    Plan:   1. Continue current therapy    Mosaiq chart and setup information reviewed. Imaging reviewed.     Karen Son MD  Pager: [phone]          Again, thank you for allowing me to participate in the care of your patient.      Sincerely,    Karen Son MD

## 2019-12-23 NOTE — LETTER
Date:December 26, 2019      Patient was self referred, no letter generated. Do not send.        [facility] Physicians Health Information

## 2019-12-24 ENCOUNTER — APPOINTMENT (OUTPATIENT)
Dept: RADIATION ONCOLOGY | Facility: CLINIC | Age: 74
End: 2019-12-24
Attending: RADIOLOGY
Payer: MEDICARE

## 2019-12-24 PROCEDURE — 77386 ZZH IMRT TREATMENT DELIVERY, COMPLEX: CPT | Performed by: RADIOLOGY

## 2019-12-24 NOTE — PROGRESS NOTES
[facility] PHYSICIANS  SPECIALIZING IN BREAKTHROUGHS  Radiation Oncology    On Treatment Visit Note      Carlo Pacheco      Date: 2019   MRN: [id number]   : 1945  Diagnosis: Myoxofibrosarcoma    Treatment Summary to Date  Treatment Site: Right leg Current Dose: 4600/5000 cGy Fractions:       Chemotherapy  Chemo concurrent with radx?: No    Subjective: Mr. Pacheco is symptomatically well this week with no changes. He has no pain, extremity weakness, numbness or tingling. He reports no skin changes.     Nursing ROS:   Nutrition Alteration  Diet Type: Patient's Preference  Skin  Skin Reaction: 0 - No changes           Gastrointestinal  GI Note: WNL     Psychosocial  Pyschosocial Note: Feeling well  Pain Assessment  0-10 Pain Scale: 0    Objective:   BP (!) 170/108   Pulse 68   Wt 73 kg (161 lb)   BMI 23.10 kg/m    Gen: Appears well, NAD  Skin: No erythema    Assessment:    Tolerating radiation therapy well.  All questions and concerns addressed.    Plan:   1. Continue current therapy    Mosaiq chart and setup information reviewed. Imaging reviewed.     Karen Son MD  Pager: [phone]

## 2019-12-26 ENCOUNTER — APPOINTMENT (OUTPATIENT)
Dept: RADIATION ONCOLOGY | Facility: CLINIC | Age: 74
End: 2019-12-26
Attending: RADIOLOGY
Payer: MEDICARE

## 2019-12-26 PROCEDURE — 77336 RADIATION PHYSICS CONSULT: CPT | Performed by: RADIOLOGY

## 2019-12-26 PROCEDURE — 77386 ZZH IMRT TREATMENT DELIVERY, COMPLEX: CPT | Performed by: RADIOLOGY

## 2020-02-03 ENCOUNTER — ONCOLOGY VISIT (OUTPATIENT)
Dept: RADIATION ONCOLOGY | Facility: CLINIC | Age: 75
End: 2020-02-03

## 2020-02-05 NOTE — PROCEDURES
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     Treatment Summary:  Radiation Oncology - Course: 1   Treatment Site    Current Dose         Modality          From               To            Elapsed Days           Fx.  1Right leg               5,000 cGy            6X/10X       11/19/2019    2019           37                       25     Dose per Fraction: 200 cGy     COMMENTS: Mr. 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 He was referred for preoperative radiation which he tolerated without complications. He is now planned   to return to [facility] for surgical resection with Dr. Arreola.      Acute Toxicity Profile (CTCAE v5.0)     Grade 1 Fatigue      PAIN MANAGEMENT:      N/A - none required      FOLLOW UP PLAN:      The patient will follow-up as needed and will be seen at [facility] for MR imaging and surgery with Dr. Arreola which has been scheduled for January.         Staff Physician: Karen Son MD  Physicist: Donte Martines,PhD     CC:                                     Radiation Oncology:  [facility] [address] [place] [address]

## 2020-03-12 ENCOUNTER — RECORDS - HEALTHEAST (OUTPATIENT)
Dept: LAB | Facility: CLINIC | Age: 75
End: 2020-03-12

## 2020-03-12 NOTE — TELEPHONE ENCOUNTER
4 images from [facility] pushed tp PACS 1:03 PM 11/4/2019   
Strong peripheral pulses

## 2020-03-13 LAB
ALBUMIN SERPL-MCNC: 2.7 G/DL (ref 3.5–5)
ALP SERPL-CCNC: 100 U/L (ref 45–120)
ALT SERPL W P-5'-P-CCNC: 19 U/L (ref 0–45)
ANION GAP SERPL CALCULATED.3IONS-SCNC: 10 MMOL/L (ref 5–18)
AST SERPL W P-5'-P-CCNC: 17 U/L (ref 0–40)
BILIRUB SERPL-MCNC: 0.3 MG/DL (ref 0–1)
BUN SERPL-MCNC: 20 MG/DL (ref 8–28)
CALCIUM SERPL-MCNC: 9.5 MG/DL (ref 8.5–10.5)
CHLORIDE BLD-SCNC: 99 MMOL/L (ref 98–107)
CO2 SERPL-SCNC: 26 MMOL/L (ref 22–31)
CREAT SERPL-MCNC: 0.84 MG/DL (ref 0.7–1.3)
ERYTHROCYTE [DISTWIDTH] IN BLOOD BY AUTOMATED COUNT: 14.2 % (ref 11–14.5)
GFR SERPL CREATININE-BSD FRML MDRD: >60 ML/MIN/1.73M2
GLUCOSE BLD-MCNC: 110 MG/DL (ref 70–125)
HCT VFR BLD AUTO: 33.4 % (ref 40–54)
HGB BLD-MCNC: 10.5 G/DL (ref 14–18)
MAGNESIUM SERPL-MCNC: 1.8 MG/DL (ref 1.8–2.6)
MCH RBC QN AUTO: 29.7 PG (ref 27–34)
MCHC RBC AUTO-ENTMCNC: 31.4 G/DL (ref 32–36)
MCV RBC AUTO: 95 FL (ref 80–100)
PHOSPHATE SERPL-MCNC: 3.4 MG/DL (ref 2.5–4.5)
PLATELET # BLD AUTO: 386 THOU/UL (ref 140–440)
PMV BLD AUTO: 10.9 FL (ref 8.5–12.5)
POTASSIUM BLD-SCNC: 3.7 MMOL/L (ref 3.5–5)
PROT SERPL-MCNC: 6.4 G/DL (ref 6–8)
RBC # BLD AUTO: 3.53 MILL/UL (ref 4.4–6.2)
SODIUM SERPL-SCNC: 135 MMOL/L (ref 136–145)
WBC: 8.1 THOU/UL (ref 4–11)

## 2020-09-23 ENCOUNTER — ANCILLARY PROCEDURE (OUTPATIENT)
Dept: MRI IMAGING | Facility: CLINIC | Age: 75
End: 2020-09-23
Attending: NURSE PRACTITIONER
Payer: MEDICARE

## 2020-09-23 DIAGNOSIS — C49.9 SARCOMA (H): ICD-10-CM

## 2020-09-23 RX ORDER — GADOBUTROL 604.72 MG/ML
7.5 INJECTION INTRAVENOUS ONCE
Status: COMPLETED | OUTPATIENT
Start: 2020-09-23 | End: 2020-09-23

## 2020-09-23 RX ADMIN — GADOBUTROL 7.5 ML: 604.72 INJECTION INTRAVENOUS at 12:33

## 2020-09-23 NOTE — DISCHARGE INSTRUCTIONS
MRI Contrast Discharge Instructions    The IV contrast you received today will pass out of your body in your  urine. This will happen in the next 24 hours. You will not feel this process.  Your urine will not change color.    Drink at least 4 extra glasses of water or juice today (unless your doctor  has restricted your fluids). This reduces the stress on your kidneys.  You may take your regular medicines.    If you are on dialysis: It is best to have dialysis today.    If you have a reaction: Most reactions happen right away. If you have  any new symptoms after leaving the hospital (such as hives or swelling),  call your hospital at the correct number below. Or call your family doctor.  If you have breathing distress or wheezing, call 911.    Special instructions: ***    I have read and understand the above information.    Signature:______________________________________ Date:___________    Staff:__________________________________________ Date:___________     Time:__________    [facility] Departments:    ___[place]: [phone]  ___[facility]: [phone]  ___[place]: [phone] ___[facility]: [phone]  ___[facility]: [phone]  ___[facility]: [phone]  ___[phone]  ___[facility]: [phone]  ___[phone]

## 2021-02-24 ENCOUNTER — OFFICE VISIT (OUTPATIENT)
Dept: URGENT CARE | Facility: URGENT CARE | Age: 76
End: 2021-02-24
Payer: MEDICARE

## 2021-02-24 VITALS
BODY MASS INDEX: 22.12 KG/M2 | DIASTOLIC BLOOD PRESSURE: 106 MMHG | WEIGHT: 158 LBS | OXYGEN SATURATION: 96 % | SYSTOLIC BLOOD PRESSURE: 160 MMHG | HEART RATE: 79 BPM | TEMPERATURE: 98 F | HEIGHT: 71 IN

## 2021-02-24 DIAGNOSIS — R31.9 HEMATURIA, UNSPECIFIED TYPE: Primary | ICD-10-CM

## 2021-02-24 DIAGNOSIS — N30.00 ACUTE CYSTITIS WITHOUT HEMATURIA: ICD-10-CM

## 2021-02-24 DIAGNOSIS — R82.90 NONSPECIFIC FINDING ON EXAMINATION OF URINE: ICD-10-CM

## 2021-02-24 LAB
ALBUMIN UR-MCNC: >=300 MG/DL
APPEARANCE UR: ABNORMAL
BACTERIA #/AREA URNS HPF: ABNORMAL /HPF
BILIRUB UR QL STRIP: NEGATIVE
COLOR UR AUTO: ABNORMAL
GLUCOSE UR STRIP-MCNC: NEGATIVE MG/DL
HGB UR QL STRIP: ABNORMAL
KETONES UR STRIP-MCNC: NEGATIVE MG/DL
LEUKOCYTE ESTERASE UR QL STRIP: ABNORMAL
NITRATE UR QL: NEGATIVE
NON-SQ EPI CELLS #/AREA URNS LPF: ABNORMAL /LPF
PH UR STRIP: 7 PH (ref 5–7)
RBC #/AREA URNS AUTO: ABNORMAL /HPF
SOURCE: ABNORMAL
SP GR UR STRIP: 1.02 (ref 1–1.03)
UROBILINOGEN UR STRIP-ACNC: 0.2 EU/DL (ref 0.2–1)
WBC #/AREA URNS AUTO: ABNORMAL /HPF

## 2021-02-24 PROCEDURE — 87088 URINE BACTERIA CULTURE: CPT | Performed by: PHYSICIAN ASSISTANT

## 2021-02-24 PROCEDURE — 87086 URINE CULTURE/COLONY COUNT: CPT | Performed by: PHYSICIAN ASSISTANT

## 2021-02-24 PROCEDURE — 99203 OFFICE O/P NEW LOW 30 MIN: CPT | Performed by: PHYSICIAN ASSISTANT

## 2021-02-24 PROCEDURE — 81001 URINALYSIS AUTO W/SCOPE: CPT | Performed by: PHYSICIAN ASSISTANT

## 2021-02-24 PROCEDURE — 87186 SC STD MICRODIL/AGAR DIL: CPT | Performed by: PHYSICIAN ASSISTANT

## 2021-02-24 RX ORDER — SULFAMETHOXAZOLE/TRIMETHOPRIM 800-160 MG
1 TABLET ORAL 2 TIMES DAILY
Qty: 8 TABLET | Refills: 0 | Status: SHIPPED | OUTPATIENT
Start: 2021-02-24 | End: 2021-02-28

## 2021-02-24 ASSESSMENT — MIFFLIN-ST. JEOR: SCORE: 1468.81

## 2021-02-24 NOTE — PROGRESS NOTES
Hematuria, unspecified type  - *UA reflex to Microscopic and Culture ([place] and [facility] (except [place] and [place])  - Urine Microscopic  - sulfamethoxazole-trimethoprim (BACTRIM DS) 800-160 MG tablet; Take 1 tablet by mouth 2 times daily for 4 days    Nonspecific finding on examination of urine  - Urine Culture Aerobic Bacterial  - sulfamethoxazole-trimethoprim (BACTRIM DS) 800-160 MG tablet; Take 1 tablet by mouth 2 times daily for 4 days    Acute cystitis without hematuria  - sulfamethoxazole-trimethoprim (BACTRIM DS) 800-160 MG tablet; Take 1 tablet by mouth 2 times daily for 4 days    30 minutes spent on the date of the encounter doing chart review, history and exam, documentation and further activities as noted above     See Patient Instructions  Patient Instructions     Patient Education     Bladder Infection, Male (Adult)     You have a bladder infection.  Urine is normally free of bacteria. But bacteria can get into the urinary tract from the skin around the rectum. Or it may travel in the blood from other parts of the body.  This is called a urinary tract infection (UTI). An infection can occur anywhere in the urinary tract. It could be in a kidney (pyelonephritis) or in the bladder (cystitis) and urethra (urethritis). The urethra is the tube that drains urine from the bladder through the tip of the penis.  The most common place for a UTI is in the bladder. This is called a bladder infection. Most bladder infections are easily treated. They are not serious unless the infection spreads up to the kidney.  The terms bladder infection, UTI, and cystitis are often used to describe the same thing. But they aren t always the same. Cystitis is an inflammation of the bladder. The most common cause of cystitis is an infection.   Keep in mind:    Infections in the urine are called UTIs.    Cystitis is often caused by a UTI.    Not all UTIs and cases of cystitis are bladder infections.    Bladder  infections are the most common type of cystitis.  Symptoms of a bladder infection  The infection causes inflammation in the urethra and bladder. This inflammation causes many of the symptoms. The most common symptoms of a bladder infection are:    Pain or burning when urinating    Having to go more often than normal    Feeling like you need to go right away    Only a small amount of urine comes out    Blood in urine    Discomfort in your belly (abdomen), often in the lower belly, above the pubic bone    Cloudy, strong, or bad-smelling urine    Unable to urinate (retention)    Urinary incontinence    Fever    Loss of appetite  Older adults may also feel confused.  Causes of a bladder infection  Bladder infections are not contagious. You can't get one from someone else, from a toilet seat, or from sharing a bath.  The most common cause of bladder infections is bacteria from the bowels. The bacteria get onto the skin around the opening of the urethra. From there they can get into the urine and travel up to the bladder. This causes inflammation and an infection. This often happens because of:    An enlarged prostate    Poor cleaning of the genitals    Procedures that put a tube in your bladder, such as a Epps catheter    Bowel incontinence    Older age    Not emptying your bladder (the urine stays there, giving the bacteria a chance to grow)    Dehydration (this lets urine to stay in the bladder longer)    Constipation (this can cause the bowels to push on the bladder or urethra and keep the bladder from emptying)  Treatment  Bladder infections are treated with antibiotics. They often clear up quickly without complications. Treatment helps prevent a more serious kidney infection.  Medicines  Medicines can help in treating a bladder infection:    You may have been given phenazopyridine to ease burning when you urinate. It will cause your urine to be bright orange. It can stain clothing.    You may have been prescribed  antibiotics. Take this medicine until you have finished it, even if you feel better. Taking all of the medicine will make sure the infection has cleared.  You can use acetaminophen or ibuprofen for pain, fever, or discomfort, unless another medicine was prescribed. You can also alternate them, or use both together. They work differently and are a different class of medicines, so taking them together is not an overdose. If you have chronic liver or kidney disease, talk with your healthcare provider before using these medicines. Also talk with your provider if you ve had a stomach ulcer or GI (gastrointestinal) bleeding or are taking blood thinner medicines.  Home care  Here are some guidelines to help you care for yourself at home:    Drink plenty of fluids, unless your healthcare provider told you not to. Fluids will prevent dehydration and flush out your bladder.    Use good personal hygiene. Wipe from front to back after using the toilet, and clean your penis regularly. If you aren t circumcised, retract the foreskin when cleaning.    Urinate more often, and don t try to hold it in for long periods of time, if possible.    Wear loose-fitting clothes and cotton underwear. Don't wear tight-fitting pants. This helps keep you clean and dry.    Change your diet to prevent constipation. This means eating more fresh foods and more fiber, and less junk and fatty foods.    Don't have sex until your symptoms are gone.    Don't have caffeine, alcohol, and spicy foods. These can irritate the bladder.  Follow-up care  Follow up with your healthcare provider, or as advised, if all symptoms have not cleared up in 5 days. It's important to keep your follow-up appointment. You can talk with your provider to see if you need more tests of the urinary tract. This is especially important if you have infections that keep coming back.  If a culture was done, you will be told if your treatment needs to be changed. If directed, you can  call to find out the results.  If X-rays were taken, you will be told of any findings that may affect your care.  Call 911  Call 911 if any of these occur:    Trouble breathing    Trouble waking up    Feeling confused    Fainting or loss of consciousness    Fast heart rate  When to get medical advice  Call your healthcare provider right away if any of these occur:    Fever of 100.4 F (38 C) or higher, or as directed by your provider    Your symptoms don t improve after 2 days of treatment    Back or b pain that gets worse    Repeated vomiting, or you aren t able to keep medicine down    Weakness or dizziness  Debra last reviewed this educational content on 10/1/2019    1143-1005 [employer]. [address] [place] [address], [place] [id number]. All rights reserved. This information is not intended as a substitute for professional medical care. Always follow your healthcare professional's instructions.               MIKAELA [facility] URGENT CARE    Subjective   75 year old year old who presents to clinic today for the following health issues:    Urgent Care and Urinary Problem       HPI     Genitourinary - Male  Onset/Duration: Today  Description:   Dysuria (painful urination): YES}  Hematuria (blood in urine): YES  Frequency: YES  Waking at night to urinate: YES  Hesitancy (delay in urine): no  Retention (unable to empty): no  Decrease in urinary flow: no  Incontinence: no  Progression of Symptoms:  worsening  Accompanying Signs & Symptoms:  Fever: no  Back/Flank pain: no  Urethral discharge: no  Testicle lumps/masses/pain: no  Nausea and/or vomiting: no  Abdominal pain: no  History:   History of frequent UTI s: no  History of kidney stones: no  History of hernias: no  Personal or Family history of Prostate problems: no  Sexually active: no  Precipitating or alleviating factors: None  Therapies tried and outcome: none      Review of Systems   Review of Systems   See HPI  Objective     Temp: 98  F (36.7  C)   BP: (!) 160/106 Pulse: 79     SpO2: 96 %       Physical Exam   Physical Exam  Constitutional:       General: He is not in acute distress.     Appearance: Normal appearance. He is normal weight. He is not ill-appearing, toxic-appearing or diaphoretic.   HENT:      Head: Normocephalic and atraumatic.   Cardiovascular:      Rate and Rhythm: Normal rate and regular rhythm.      Pulses: Normal pulses.      Heart sounds: Normal heart sounds. No murmur. No friction rub. No gallop.    Pulmonary:      Effort: Pulmonary effort is normal. No respiratory distress.      Breath sounds: Normal breath sounds. No stridor. No wheezing, rhonchi or rales.   Chest:      Chest wall: No tenderness.   Abdominal:      General: Abdomen is flat. Bowel sounds are normal. There is no distension.      Palpations: Abdomen is soft. There is no mass.      Tenderness: There is no abdominal tenderness. There is no right CVA tenderness, left CVA tenderness, guarding or rebound.      Hernia: No hernia is present.   Neurological:      General: No focal deficit present.      Mental Status: He is alert and oriented to person, place, and time. Mental status is at baseline.      Gait: Gait normal.   Psychiatric:         Mood and Affect: Mood normal.         Behavior: Behavior normal.         Thought Content: Thought content normal.         Judgment: Judgment normal.          Results for orders placed or performed in visit on 02/24/21 (from the past 24 hour(s))   *UA reflex to Microscopic and Culture ([place] and [facility] (except [place] and [place])    Specimen: Midstream Urine   Result Value Ref Range    Color Urine Red     Appearance Urine Slightly Cloudy     Glucose Urine Negative NEG^Negative mg/dL    Bilirubin Urine Negative NEG^Negative    Ketones Urine Negative NEG^Negative mg/dL    Specific Gravity Urine 1.025 1.003 - 1.035    Blood Urine Large (A) NEG^Negative    pH Urine 7.0 5.0 - 7.0 pH    Protein Albumin Urine  >=300 (A) NEG^Negative mg/dL    Urobilinogen Urine 0.2 0.2 - 1.0 EU/dL    Nitrite Urine Negative NEG^Negative    Leukocyte Esterase Urine Moderate (A) NEG^Negative    Source Midstream Urine    Urine Microscopic   Result Value Ref Range    WBC Urine 10-25 (A) OTO5^0 - 5 /HPF    RBC Urine  (A) OTO2^O - 2 /HPF    Squamous Epithelial /LPF Urine Few FEW^Few /LPF    Bacteria Urine Moderate (A) NEG^Negative /HPF

## 2021-02-24 NOTE — PATIENT INSTRUCTIONS
Patient Education     Bladder Infection, Male (Adult)     You have a bladder infection.  Urine is normally free of bacteria. But bacteria can get into the urinary tract from the skin around the rectum. Or it may travel in the blood from other parts of the body.  This is called a urinary tract infection (UTI). An infection can occur anywhere in the urinary tract. It could be in a kidney (pyelonephritis) or in the bladder (cystitis) and urethra (urethritis). The urethra is the tube that drains urine from the bladder through the tip of the penis.  The most common place for a UTI is in the bladder. This is called a bladder infection. Most bladder infections are easily treated. They are not serious unless the infection spreads up to the kidney.  The terms bladder infection, UTI, and cystitis are often used to describe the same thing. But they aren t always the same. Cystitis is an inflammation of the bladder. The most common cause of cystitis is an infection.   Keep in mind:    Infections in the urine are called UTIs.    Cystitis is often caused by a UTI.    Not all UTIs and cases of cystitis are bladder infections.    Bladder infections are the most common type of cystitis.  Symptoms of a bladder infection  The infection causes inflammation in the urethra and bladder. This inflammation causes many of the symptoms. The most common symptoms of a bladder infection are:    Pain or burning when urinating    Having to go more often than normal    Feeling like you need to go right away    Only a small amount of urine comes out    Blood in urine    Discomfort in your belly (abdomen), often in the lower belly, above the pubic bone    Cloudy, strong, or bad-smelling urine    Unable to urinate (retention)    Urinary incontinence    Fever    Loss of appetite  Older adults may also feel confused.  Causes of a bladder infection  Bladder infections are not contagious. You can't get one from someone else, from a toilet seat, or from  sharing a bath.  The most common cause of bladder infections is bacteria from the bowels. The bacteria get onto the skin around the opening of the urethra. From there they can get into the urine and travel up to the bladder. This causes inflammation and an infection. This often happens because of:    An enlarged prostate    Poor cleaning of the genitals    Procedures that put a tube in your bladder, such as a Epps catheter    Bowel incontinence    Older age    Not emptying your bladder (the urine stays there, giving the bacteria a chance to grow)    Dehydration (this lets urine to stay in the bladder longer)    Constipation (this can cause the bowels to push on the bladder or urethra and keep the bladder from emptying)  Treatment  Bladder infections are treated with antibiotics. They often clear up quickly without complications. Treatment helps prevent a more serious kidney infection.  Medicines  Medicines can help in treating a bladder infection:    You may have been given phenazopyridine to ease burning when you urinate. It will cause your urine to be bright orange. It can stain clothing.    You may have been prescribed antibiotics. Take this medicine until you have finished it, even if you feel better. Taking all of the medicine will make sure the infection has cleared.  You can use acetaminophen or ibuprofen for pain, fever, or discomfort, unless another medicine was prescribed. You can also alternate them, or use both together. They work differently and are a different class of medicines, so taking them together is not an overdose. If you have chronic liver or kidney disease, talk with your healthcare provider before using these medicines. Also talk with your provider if you ve had a stomach ulcer or GI (gastrointestinal) bleeding or are taking blood thinner medicines.  Home care  Here are some guidelines to help you care for yourself at home:    Drink plenty of fluids, unless your healthcare provider told you  not to. Fluids will prevent dehydration and flush out your bladder.    Use good personal hygiene. Wipe from front to back after using the toilet, and clean your penis regularly. If you aren t circumcised, retract the foreskin when cleaning.    Urinate more often, and don t try to hold it in for long periods of time, if possible.    Wear loose-fitting clothes and cotton underwear. Don't wear tight-fitting pants. This helps keep you clean and dry.    Change your diet to prevent constipation. This means eating more fresh foods and more fiber, and less junk and fatty foods.    Don't have sex until your symptoms are gone.    Don't have caffeine, alcohol, and spicy foods. These can irritate the bladder.  Follow-up care  Follow up with your healthcare provider, or as advised, if all symptoms have not cleared up in 5 days. It's important to keep your follow-up appointment. You can talk with your provider to see if you need more tests of the urinary tract. This is especially important if you have infections that keep coming back.  If a culture was done, you will be told if your treatment needs to be changed. If directed, you can call to find out the results.  If X-rays were taken, you will be told of any findings that may affect your care.  Call 911  Call 911 if any of these occur:    Trouble breathing    Trouble waking up    Feeling confused    Fainting or loss of consciousness    Fast heart rate  When to get medical advice  Call your healthcare provider right away if any of these occur:    Fever of 100.4 F (38 C) or higher, or as directed by your provider    Your symptoms don t improve after 2 days of treatment    Back or b pain that gets worse    Repeated vomiting, or you aren t able to keep medicine down    Weakness or dizziness  [employer] last reviewed this educational content on 10/1/2019    2338-0587 [employer]. [address], [place] [address]. All rights reserved. This information is not  intended as a substitute for professional medical care. Always follow your healthcare professional's instructions.

## 2021-02-26 LAB
BACTERIA SPEC CULT: ABNORMAL
Lab: ABNORMAL
SPECIMEN SOURCE: ABNORMAL

## 2021-03-09 ENCOUNTER — OFFICE VISIT (OUTPATIENT)
Dept: URGENT CARE | Facility: URGENT CARE | Age: 76
End: 2021-03-09
Payer: MEDICARE

## 2021-03-09 ENCOUNTER — NURSE TRIAGE (OUTPATIENT)
Dept: NURSING | Facility: CLINIC | Age: 76
End: 2021-03-09

## 2021-03-09 VITALS
OXYGEN SATURATION: 95 % | DIASTOLIC BLOOD PRESSURE: 78 MMHG | SYSTOLIC BLOOD PRESSURE: 132 MMHG | WEIGHT: 146 LBS | TEMPERATURE: 98.1 F | RESPIRATION RATE: 16 BRPM | BODY MASS INDEX: 20.36 KG/M2 | HEART RATE: 71 BPM

## 2021-03-09 DIAGNOSIS — N30.01 ACUTE CYSTITIS WITH HEMATURIA: Primary | ICD-10-CM

## 2021-03-09 LAB
ALBUMIN UR-MCNC: 100 MG/DL
APPEARANCE UR: ABNORMAL
BACTERIA #/AREA URNS HPF: ABNORMAL /HPF
BILIRUB UR QL STRIP: NEGATIVE
COLOR UR AUTO: ABNORMAL
GLUCOSE UR STRIP-MCNC: NEGATIVE MG/DL
HGB UR QL STRIP: ABNORMAL
KETONES UR STRIP-MCNC: NEGATIVE MG/DL
LEUKOCYTE ESTERASE UR QL STRIP: ABNORMAL
NITRATE UR QL: NEGATIVE
NON-SQ EPI CELLS #/AREA URNS LPF: ABNORMAL /LPF
PH UR STRIP: 7 PH (ref 5–7)
RBC #/AREA URNS AUTO: ABNORMAL /HPF
SOURCE: ABNORMAL
SP GR UR STRIP: 1.02 (ref 1–1.03)
UROBILINOGEN UR STRIP-ACNC: 0.2 EU/DL (ref 0.2–1)
WBC #/AREA URNS AUTO: ABNORMAL /HPF

## 2021-03-09 PROCEDURE — 81001 URINALYSIS AUTO W/SCOPE: CPT | Performed by: PHYSICIAN ASSISTANT

## 2021-03-09 PROCEDURE — 99213 OFFICE O/P EST LOW 20 MIN: CPT | Performed by: PHYSICIAN ASSISTANT

## 2021-03-09 RX ORDER — SULFAMETHOXAZOLE/TRIMETHOPRIM 800-160 MG
1 TABLET ORAL 2 TIMES DAILY
Qty: 14 TABLET | Refills: 0 | Status: SHIPPED | OUTPATIENT
Start: 2021-03-09 | End: 2021-03-16

## 2021-03-09 NOTE — PROGRESS NOTES
URGENT CARE VISIT:    SUBJECTIVE:    Carlo Pacheco is a 75 year old male who  presents today for a possible UTI. Symptoms of dysuria, urgency and frequency have been going on for 1 day(s). Symptoms were gradual onset and moderate.  Patient denies back pain, nausea, vomiting, fever and chills or penile discharge. This patient does have a history of urinary tract infections. Treated with a 4 day course of Bactrim Ds on 2/21/21 which resolved symptoms.     PMH:   Past Medical History:   Diagnosis Date     Anxiety      COPD (chronic obstructive pulmonary disease) (H)      Emphysema lung (H)      GERD (gastroesophageal reflux disease)      Hypertension      Lipoma      Prostate cancer (H)      Allergies: Prilosec [omeprazole], Penicillins, and Zithromax [azithromycin]   Medications:   Current Outpatient Medications   Medication Sig Dispense Refill     coenzyme Q-10-vitamin E 50-15 MG-UNT/5ML SYRP Take 5 mLs by mouth       LISINOPRIL PO Take 10 mg by mouth       lisinopril-hydrochlorothiazide (PRINZIDE/ZESTORETIC) 20-12.5 MG tablet Take 1 tablet by mouth       sertraline (ZOLOFT) 25 MG tablet        sulfamethoxazole-trimethoprim (BACTRIM DS) 800-160 MG tablet Take 1 tablet by mouth 2 times daily for 7 days 14 tablet 0     timolol maleate (TIMOPTIC) 0.5 % ophthalmic solution        vitamin C (ASCORBIC ACID) 100 MG tablet Take 1,000 mg by mouth       zinc gluconate 50 MG tablet Take 50 mg by mouth       Social History:   Social History     Tobacco Use     Smoking status: Never Smoker     Smokeless tobacco: Never Used   Substance Use Topics     Alcohol use: Not on file       ROS:  Review of systems negative except as stated above.    OBJECTIVE:  /78 (BP Location: Right arm, Patient Position: Sitting, Cuff Size: Adult Regular)   Pulse 71   Temp 98.1  F (36.7  C) (Tympanic)   Resp 16   Wt 66.2 kg (146 lb)   SpO2 95%   BMI 20.36 kg/m    GENERAL APPEARANCE: healthy, alert and no distress  RESP: lungs clear to  auscultation - no rales, rhonchi or wheezes  CV: regular rates and rhythm, normal S1 S2, no murmur noted  ABDOMEN:  soft, nontender, no HSM or masses and bowel sounds normal  BACK: No CVA tenderness  SKIN: no suspicious lesions or rashes    Labs:    Results for orders placed or performed in visit on 03/09/21   UA with Microscopic reflex to Culture     Status: Abnormal    Specimen: Midstream Urine   Result Value Ref Range    Color Urine Orange     Appearance Urine Slightly Cloudy     Glucose Urine Negative NEG^Negative mg/dL    Bilirubin Urine Negative NEG^Negative    Ketones Urine Negative NEG^Negative mg/dL    Specific Gravity Urine 1.020 1.003 - 1.035    pH Urine 7.0 5.0 - 7.0 pH    Protein Albumin Urine 100 (A) NEG^Negative mg/dL    Urobilinogen Urine 0.2 0.2 - 1.0 EU/dL    Nitrite Urine Negative NEG^Negative    Blood Urine Large (A) NEG^Negative    Leukocyte Esterase Urine Small (A) NEG^Negative    Source Midstream Urine     WBC Urine 5-10 (A) OTO5^0 - 5 /HPF    RBC Urine  (A) OTO2^O - 2 /HPF    Squamous Epithelial /LPF Urine Few FEW^Few /LPF    Bacteria Urine Few (A) NEG^Negative /HPF       ASSESSMENT:     ICD-10-CM    1. Acute cystitis with hematuria  N30.01 UA with Microscopic reflex to Culture     sulfamethoxazole-trimethoprim (BACTRIM DS) 800-160 MG tablet       PLAN:  Patient Instructions   Patient was educated on the natural course of condition  Take medication as directed. Side effects discussed. Conservative measures discussed including drinking fluids (water) and avoiding holding urine when there is urge to urinate. See your primary care provider if symptoms do not improve in 3 days. Seek emergency care if you develop severe abdominal/flank pain, or fever.     Patient verbalized understanding and is agreeable to plan. The patient was discharged ambulatory and in stable condition.    Ludy Haley PA-C on 3/9/2021 at 1:56 PM

## 2021-03-09 NOTE — TELEPHONE ENCOUNTER
Patient was seen in urgent care 2/24/21 and diagnosed with a UTI. Was treated with a 4 days course of Bactrim. Patient reports that symptoms did resolve. Today patient feels like he is starting to have frequency and burning with urination again. Patient denies any fever or lower back pain. Patient is asking for a refill of antibiotics.     Patient is advised that he needs to be seen in clinic today. Patient verbalizes understanding and denies further questions at this time.    Aga Ramos RN  [facility]        Additional Information    Negative: Shock suspected (e.g., cold/pale/clammy skin, too weak to stand, low BP, rapid pulse)    Negative: Sounds like a life-threatening emergency to the triager    Negative: Unable to urinate (or only a few drops) and bladder feels very full    Negative: Swollen scrotum    Negative: Pain in scrotum or testicle that persists > 1 hour    Negative: Fever > 100.5 F (38.1 C)    Negative: Vomiting    Negative: Patient sounds very sick or weak to the triager    Negative: Severe pain with urination    Negative: Side (flank) or lower back pain present    All other males with painful urination, or patient wants to be seen    Negative: Taking antibiotic > 24 hours for UTI and fever persists    Negative: Taking antibiotic > 3 days for UTI and painful urination not improved    Negative: Taking treatment > 3 days for STD (e.g., penile discharge from gonorrhea, chlamydia) and painful urination not improved    Protocols used: URINATION PAIN - MALE-A-[place]    COVID 19 Nurse Triage Plan/Patient Instructions    Please be aware that novel coronavirus (COVID-19) may be circulating in the community. If you develop symptoms such as fever, cough, or SOB or if you have concerns about the presence of another infection including coronavirus (COVID-19), please contact your health care provider or visit [email or web address].     Disposition/Instructions    In-Person Visit with  provider recommended. Reference Visit Selection Guide.    Thank you for taking steps to prevent the spread of this virus.  o Limit your contact with others.  o Wear a simple mask to cover your cough.  o Wash your hands well and often.    Resources    [facility]: About COVID-19: [email or web address]    CDC: What to Do If You're Sick: www.cdc.gov/coronavirus/2019-ncov/about/steps-when-sick.html    CDC: Ending Home Isolation: www.cdc.gov/coronavirus/2019-ncov/hcp/disposition-in-home-patients.html     CDC: Caring for Someone: www.cdc.gov/coronavirus/2019-ncov/if-you-are-sick/care-for-someone.html     [facility]: Interim Guidance for Hospital Discharge to Home: [email or web address]    [facility] clinical trials (COVID-19 research studies): [email or web address]-clinical-trials     Below are the COVID-19 hotlines at the [facility] ([facility]). Interpreters are available.   o For health questions: Call 637-564-9607 or 1-448.791.7550 (7 a.m. to 7 p.m.)  o For questions about schools and childcare: Call 421-350-5545 or 1-285.146.4948 (7 a.m. to 7 p.m.)

## 2021-03-09 NOTE — PATIENT INSTRUCTIONS
Patient was educated on the natural course of condition  Take medication as directed. Side effects discussed. Conservative measures discussed including drinking fluids (water) and avoiding holding urine when there is urge to urinate. See your primary care provider if symptoms do not improve in 3 days. Seek emergency care if you develop severe abdominal/flank pain, or fever.

## 2021-03-16 ENCOUNTER — OFFICE VISIT (OUTPATIENT)
Dept: URGENT CARE | Facility: URGENT CARE | Age: 76
End: 2021-03-16
Payer: MEDICARE

## 2021-03-16 VITALS
SYSTOLIC BLOOD PRESSURE: 144 MMHG | DIASTOLIC BLOOD PRESSURE: 82 MMHG | WEIGHT: 146 LBS | OXYGEN SATURATION: 99 % | HEART RATE: 60 BPM | BODY MASS INDEX: 20.36 KG/M2 | TEMPERATURE: 97.5 F

## 2021-03-16 DIAGNOSIS — R39.15 URGENCY OF URINATION: Primary | ICD-10-CM

## 2021-03-16 DIAGNOSIS — R31.29 MICROSCOPIC HEMATURIA: ICD-10-CM

## 2021-03-16 PROBLEM — K21.9 GASTROESOPHAGEAL REFLUX DISEASE WITHOUT ESOPHAGITIS: Status: ACTIVE | Noted: 2020-02-10

## 2021-03-16 PROBLEM — D62 POSTOPERATIVE ANEMIA DUE TO ACUTE BLOOD LOSS: Status: ACTIVE | Noted: 2020-02-10

## 2021-03-16 PROBLEM — Z85.46 HISTORY OF PROSTATE CANCER: Status: ACTIVE | Noted: 2021-03-16

## 2021-03-16 PROBLEM — C49.21: Status: ACTIVE | Noted: 2020-10-02

## 2021-03-16 PROBLEM — C49.21: Status: ACTIVE | Noted: 2020-03-03

## 2021-03-16 PROBLEM — K22.0 ACHALASIA OF ESOPHAGUS: Status: ACTIVE | Noted: 2020-02-10

## 2021-03-16 PROBLEM — I82.409 DEEP VEIN THROMBOSIS (DVT) OF LOWER EXTREMITY (H): Status: ACTIVE | Noted: 2020-09-17

## 2021-03-16 PROBLEM — R26.2 DIFFICULTY IN WALKING, NOT ELSEWHERE CLASSIFIED: Status: ACTIVE | Noted: 2020-03-03

## 2021-03-16 PROBLEM — I25.10 CALCIFICATION OF CORONARY ARTERY: Status: ACTIVE | Noted: 2020-02-10

## 2021-03-16 PROBLEM — N39.46 URGE AND STRESS INCONTINENCE: Status: ACTIVE | Noted: 2017-05-10

## 2021-03-16 PROBLEM — J43.9 PULMONARY EMPHYSEMA (H): Status: ACTIVE | Noted: 2020-02-10

## 2021-03-16 LAB
ALBUMIN UR-MCNC: NEGATIVE MG/DL
APPEARANCE UR: CLEAR
BILIRUB UR QL STRIP: NEGATIVE
COLOR UR AUTO: YELLOW
GLUCOSE UR STRIP-MCNC: NEGATIVE MG/DL
HGB UR QL STRIP: ABNORMAL
KETONES UR STRIP-MCNC: NEGATIVE MG/DL
LEUKOCYTE ESTERASE UR QL STRIP: NEGATIVE
NITRATE UR QL: NEGATIVE
PH UR STRIP: 6 PH (ref 5–7)
RBC #/AREA URNS AUTO: NORMAL /HPF
SOURCE: ABNORMAL
SP GR UR STRIP: 1.01 (ref 1–1.03)
UROBILINOGEN UR STRIP-ACNC: 0.2 EU/DL (ref 0.2–1)
WBC #/AREA URNS AUTO: NORMAL /HPF

## 2021-03-16 PROCEDURE — 81001 URINALYSIS AUTO W/SCOPE: CPT | Performed by: NURSE PRACTITIONER

## 2021-03-16 PROCEDURE — 99213 OFFICE O/P EST LOW 20 MIN: CPT | Performed by: NURSE PRACTITIONER

## 2021-03-16 RX ORDER — LISINOPRIL 10 MG/1
20 TABLET ORAL
COMMUNITY
Start: 2020-12-16 | End: 2023-11-28

## 2021-03-16 RX ORDER — VENLAFAXINE 75 MG/1
75 TABLET ORAL
COMMUNITY
Start: 2020-03-21 | End: 2023-11-28

## 2021-03-16 RX ORDER — LISINOPRIL AND HYDROCHLOROTHIAZIDE 20; 25 MG/1; MG/1
1 TABLET ORAL
COMMUNITY
Start: 2020-03-22 | End: 2023-11-28

## 2021-03-16 RX ORDER — LATANOPROST 50 UG/ML
SOLUTION/ DROPS OPHTHALMIC
COMMUNITY
Start: 2020-12-09

## 2021-03-16 NOTE — PATIENT INSTRUCTIONS
Patient Education     Hematuria: Possible Causes     Many things can lead to blood in the urine (hematuria). The blood may be seen with the eye (macroscopic or gross hematuria). Or it may only be seen when the urine is looked at under a microscope (microscopic hematuria). Often no cause for the blood can be found. This is called idiopathic hematuria. Here are some of the most common causes of blood in the urine:     Kidney or bladder stones. These are collections of crystals. They form in the urine. Stones may be found anywhere in the urinary tract. But they form most often in the kidneys or bladder. In addition to blood in the urine, they can cause severe pain.    BPH (benign prostatic hyperplasia). This is enlargement of the prostate gland. It happens as men age. BPH often causes problems with urination. It sometimes causes blood in the urine.    Urinary tract infection (UTI). This is due to bacteria growing in the urinary tract. It can cause blood in the urine. Other symptoms include burning or pain with urination. You may need to urinate often or urgently. You may also have a fever.    Damage to the urinary tract may cause blood in the urine. This damage may be due to a blow or accident. It may also result from the use of a urinary catheter. Very hard exercise may sometimes irritate the urinary tract and cause bleeding.    Cancer may occur anywhere in the urinary tract. A tumor may sometimes cause no symptoms other than bleeding.  Other possible causes of bleeding include:     Prostate gland infection (prostatitis)    Taking anticoagulants    Blockage in the urinary tract    Kidney disease or inflammation    Cystic diseases of the kidneys    Sickle cell anemia    Vigorous exercise    Endometriosis  Debra last reviewed this educational content on 7/1/2019 2000-2020 [employer]. All rights reserved. This information is not intended as a substitute for professional medical care. Always follow  your healthcare professional's instructions.           Patient Education     Overactive Bladder Syndrome (OAB)  When the bladder muscles squeeze (contract) involuntarily, it is called overactive bladder syndrome (OAB). This causes an intense urge to urinate, called urgency. Urgency can occur many times during the day and night. If urine leaks with the urgency, it is called urge incontinence.   A disease that affects the bladder nerves, such as multiple sclerosis, can cause overactive bladder syndrome. Other conditions can also lead to OAB. These include urinary tract infection (UTI) or prostate problems in men. But the exact cause is often not known.      Normally, urine stays in the bladder until a person decides to release it. With OAB, the bladder muscles contract involuntarily, causing a sudden urge to urinate and even urine leakage.   How is overactive bladder syndrome diagnosed?   Your healthcare provider will examine you and ask about your symptoms and health history. You may also have 1 or more of these tests:     Urine test. Urine samples are taken and checked for problems.    Urinary diary. You record how much fluid you take in and urinate out for 3 days.    Bladder ultrasound. This studies the bladder as it empties. Ultrasound uses sound waves to make detailed images of the inside of the body.    Cystoscopy. This test lets the provider look for problems in the urinary tract. The test uses a thin, flexible scope (cystoscope) with a light and camera on the end. The scope is put into the tube that takes urine out of the body (urethra).    Urodynamic studies. This is a group of tests. They measure and record many aspects of urinary bladder function. This includes pressures, volume, and urine flow.  How is overactive bladder syndrome treated?   Treatment depends on the cause and severity of your OAB. Treatments may include:     Changing your urination habits. Your healthcare provider may advise you to urinate  as soon as you feel the urge. You may also need to limit how much fluid you have during the day.    Exercising your pelvic muscles. This can help strengthen muscles used during urination. These exercises are called Kegels. They include carmen as if you were stopping your urine stream. And tightening your rectum as if trying not to pass gas. Your provider can help you learn how to do Kegels.    Biofeedback. This can help you learn to control the movement of your bladder muscles. Sensors are placed on your belly. They turn signals given off by your muscles into lines on a computer screen.    Medicine. This may be given to relax the bladder muscle. Medicine can also help ease bladder contractions. This reduces the urge to urinate.    Neuromodulation. This may be done if medicine and behavioral changes don t work. Electrical pulses are sent to the nerves that affect the pelvic area (sacral nerves). These pulses help relieve OAB and urge incontinence.    Botulinum toxin shots. These can be injected into the muscle of the bladder to relax the muscles.    Surgery. When less invasive treatments don't work, surgery to make the bladder larger may be done in severe cases.  With treatment, OAB can be managed. A condition, such as UTI, that has led to your OAB will be treated. Treatment may include taking medicine for months or years. You may also need to make changes in your daily routine. This may include going to the bathroom more often than you think you need to. Or you may need to limit caffeine and alcohol because these can make symptoms worse. Your healthcare provider can tell you more.   When to call your healthcare provider  Call the healthcare provider right away if you have any of these:     Fever of  100.4  F ( 38.0 C ) or higher, or as directed by your provider    Symptoms don't get better, or get worse with treatment    Trouble urinating because of pain    Back or belly pain  Debra last reviewed this  educational content on 6/1/2019 2000-2020 [employer]. All rights reserved. This information is not intended as a substitute for professional medical care. Always follow your healthcare professional's instructions.

## 2021-03-16 NOTE — PROGRESS NOTES
Chief Complaint   Patient presents with     Urgent Care     UTI     c/o dysuria for 1 day         ICD-10-CM    1. Urgency of urination  R39.15 *UA reflex to Microscopic and Culture ([place] and [facility] (except [place] and [place])   2. Microscopic hematuria  R31.29    Patient with complicated urinary history and 3 presentations for the same symptoms in the last month.  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  Does not appear he has a urinary tract infection today but he does have microscopic hematuria which is concerning given his history.  Patient will follow up with primary care provider or urologist in the next week to recheck urine and see if hematuria is still present.  If symptoms worsen prior to then patient will return for further evaluation sooner.    Medical Decision Making    Differential Diagnosis:  UTI: UTI, Dysuria, Pyelonephritis, Kidney Stone, Urethritis, STD, BPH and Prostatitis, bladder cancer, renal disease      Results for orders placed or performed in visit on 03/16/21 (from the past 24 hour(s))   *UA reflex to Microscopic and Culture ([place] and [facility] (except [place] and [place])    Specimen: Midstream Urine   Result Value Ref Range    Color Urine Yellow     Appearance Urine Clear     Glucose Urine Negative NEG^Negative mg/dL    Bilirubin Urine Negative NEG^Negative    Ketones Urine Negative NEG^Negative mg/dL    Specific Gravity Urine 1.015 1.003 - 1.035    Blood Urine Trace (A) NEG^Negative    pH Urine 6.0 5.0 - 7.0 pH    Protein Albumin Urine Negative NEG^Negative mg/dL    Urobilinogen Urine 0.2 0.2 - 1.0 EU/dL    Nitrite Urine Negative NEG^Negative    Leukocyte Esterase Urine Negative NEG^Negative    Source Midstream Urine    Urine Microscopic   Result Value Ref Range    WBC Urine 0 - 5 OTO5^0 - 5 /HPF    RBC Urine O - 2 OTO2^O - 2 /HPF       Subjective      Carlo Pacheco is an 75 year oldmale who presents to clinic today for recheck of urine after completing a course of 7 days of Septra a day ago. Prior to that he was treated with 4 days of Septra for UTI, symptoms improved, but never resolved entirely. He feels a little urgency still, but no dysuria. No back pain, nausea/ Vomiting, fevers or chills. He does have a history of prostatectomy for Prostate CA.        Objective    BP (!) 144/82   Pulse 60   Temp 97.5  F (36.4  C) (Oral)   Wt 66.2 kg (146 lb)   SpO2 99%   BMI 20.36 kg/m      Physical Exam       GENERAL APPEARANCE: healthy appearing, alert     NECK: no adenopathy or asymmetry, thyroid normal to palpation     RESP: lungs clear to auscultation - no rales, rhonchi or wheezes     CV: regular rates and rhythm, no murmurs, rubs, or gallop     ABDOMEN:  soft, nontender, no HSM or masses and bowel sounds normal     SKIN: no suspicious lesions or rashes    Patient Instructions       Patient Education     Hematuria: Possible Causes     Many things can lead to blood in the urine (hematuria). The blood may be seen with the eye (macroscopic or gross hematuria). Or it may only be seen when the urine is looked at under a microscope (microscopic hematuria). Often no cause for the blood can be found. This is called idiopathic hematuria. Here are some of the most common causes of blood in the urine:     Kidney or bladder stones. These are collections of crystals. They form in the urine. Stones may be found anywhere in the urinary tract. But they form most often in the kidneys or bladder. In addition to blood in the urine, they can cause severe pain.    BPH (benign prostatic hyperplasia). This is enlargement of the prostate gland. It happens as men age. BPH often causes problems with urination. It sometimes causes blood in the urine.    Urinary tract infection (UTI). This is due to bacteria growing in the urinary tract. It can cause blood in the urine. Other  symptoms include burning or pain with urination. You may need to urinate often or urgently. You may also have a fever.    Damage to the urinary tract may cause blood in the urine. This damage may be due to a blow or accident. It may also result from the use of a urinary catheter. Very hard exercise may sometimes irritate the urinary tract and cause bleeding.    Cancer may occur anywhere in the urinary tract. A tumor may sometimes cause no symptoms other than bleeding.  Other possible causes of bleeding include:     Prostate gland infection (prostatitis)    Taking anticoagulants    Blockage in the urinary tract    Kidney disease or inflammation    Cystic diseases of the kidneys    Sickle cell anemia    Vigorous exercise    Endometriosis  [employer] last reviewed this educational content on 7/1/2019 2000-2020 [employer]. All rights reserved. This information is not intended as a substitute for professional medical care. Always follow your healthcare professional's instructions.           Patient Education     Overactive Bladder Syndrome (OAB)  When the bladder muscles squeeze (contract) involuntarily, it is called overactive bladder syndrome (OAB). This causes an intense urge to urinate, called urgency. Urgency can occur many times during the day and night. If urine leaks with the urgency, it is called urge incontinence.   A disease that affects the bladder nerves, such as multiple sclerosis, can cause overactive bladder syndrome. Other conditions can also lead to OAB. These include urinary tract infection (UTI) or prostate problems in men. But the exact cause is often not known.      Normally, urine stays in the bladder until a person decides to release it. With OAB, the bladder muscles contract involuntarily, causing a sudden urge to urinate and even urine leakage.   How is overactive bladder syndrome diagnosed?   Your healthcare provider will examine you and ask about your symptoms and health  history. You may also have 1 or more of these tests:     Urine test. Urine samples are taken and checked for problems.    Urinary diary. You record how much fluid you take in and urinate out for 3 days.    Bladder ultrasound. This studies the bladder as it empties. Ultrasound uses sound waves to make detailed images of the inside of the body.    Cystoscopy. This test lets the provider look for problems in the urinary tract. The test uses a thin, flexible scope (cystoscope) with a light and camera on the end. The scope is put into the tube that takes urine out of the body (urethra).    Urodynamic studies. This is a group of tests. They measure and record many aspects of urinary bladder function. This includes pressures, volume, and urine flow.  How is overactive bladder syndrome treated?   Treatment depends on the cause and severity of your OAB. Treatments may include:     Changing your urination habits. Your healthcare provider may advise you to urinate as soon as you feel the urge. You may also need to limit how much fluid you have during the day.    Exercising your pelvic muscles. This can help strengthen muscles used during urination. These exercises are called Kegels. They include carmen as if you were stopping your urine stream. And tightening your rectum as if trying not to pass gas. Your provider can help you learn how to do Kegels.    Biofeedback. This can help you learn to control the movement of your bladder muscles. Sensors are placed on your belly. They turn signals given off by your muscles into lines on a computer screen.    Medicine. This may be given to relax the bladder muscle. Medicine can also help ease bladder contractions. This reduces the urge to urinate.    Neuromodulation. This may be done if medicine and behavioral changes don t work. Electrical pulses are sent to the nerves that affect the pelvic area (sacral nerves). These pulses help relieve OAB and urge incontinence.    Botulinum  toxin shots. These can be injected into the muscle of the bladder to relax the muscles.    Surgery. When less invasive treatments don't work, surgery to make the bladder larger may be done in severe cases.  With treatment, OAB can be managed. A condition, such as UTI, that has led to your OAB will be treated. Treatment may include taking medicine for months or years. You may also need to make changes in your daily routine. This may include going to the bathroom more often than you think you need to. Or you may need to limit caffeine and alcohol because these can make symptoms worse. Your healthcare provider can tell you more.   When to call your healthcare provider  Call the healthcare provider right away if you have any of these:     Fever of  100.4  F ( 38.0 C ) or higher, or as directed by your provider    Symptoms don't get better, or get worse with treatment    Trouble urinating because of pain    Back or belly pain  [employer] last reviewed this educational content on 6/1/2019 2000-2020 [employer]. All rights reserved. This information is not intended as a substitute for professional medical care. Always follow your healthcare professional's instructions.               SERJIO Carranza, CNP  [facility] Provider

## 2021-05-22 ENCOUNTER — OFFICE VISIT (OUTPATIENT)
Dept: URGENT CARE | Facility: URGENT CARE | Age: 76
End: 2021-05-22
Payer: MEDICARE

## 2021-05-22 VITALS
OXYGEN SATURATION: 95 % | BODY MASS INDEX: 20.36 KG/M2 | DIASTOLIC BLOOD PRESSURE: 91 MMHG | TEMPERATURE: 98.5 F | HEART RATE: 84 BPM | SYSTOLIC BLOOD PRESSURE: 175 MMHG | WEIGHT: 146 LBS

## 2021-05-22 DIAGNOSIS — H61.22 IMPACTED CERUMEN OF LEFT EAR: Primary | ICD-10-CM

## 2021-05-22 PROCEDURE — 99213 OFFICE O/P EST LOW 20 MIN: CPT | Mod: 25 | Performed by: PHYSICIAN ASSISTANT

## 2021-05-22 PROCEDURE — 69209 REMOVE IMPACTED EAR WAX UNI: CPT | Performed by: PHYSICIAN ASSISTANT

## 2021-05-22 ASSESSMENT — ENCOUNTER SYMPTOMS
MUSCULOSKELETAL NEGATIVE: 1
RHINORRHEA: 0
CONSTITUTIONAL NEGATIVE: 1
GASTROINTESTINAL NEGATIVE: 1
CARDIOVASCULAR NEGATIVE: 1
SINUS PAIN: 0
PSYCHIATRIC NEGATIVE: 1
EYES NEGATIVE: 1
NEUROLOGICAL NEGATIVE: 1
SINUS PRESSURE: 0
FACIAL SWELLING: 0
RESPIRATORY NEGATIVE: 1

## 2021-05-22 NOTE — PROGRESS NOTES
Impacted cerumen of left ear  Patient's left ear was irrigated and cerumen was removed without complication. Left ear was re-examined and was found to be normal.     20 minutes spent on the date of the encounter doing chart review, history and exam, documentation and further activities per the note     See Patient Instructions    Patient Instructions     Patient Education       Earwax, Home Treatment    Everyone produces earwax from the lining of the ear canal. It serves to lubricate and protect the ear. The wax that forms in the canal naturally moves toward the outside of the ear and falls out. Sometimes the ear canal may contain too much wax. This can cause a blockage and loss of hearing. Directions are given below for home treatment.  Home care  If your doctor has advised you to remove a wax blockage yourself, follow these directions:    Unless a medicine was prescribed, you may use an over-the-counter product made for clearing earwax. These contain carbamide peroxide. Lie down with the blocked ear facing upward. Apply one dropper full of medicine and wait a few minutes. Grasp the outer ear and wiggle it to help the solution enter the canal.    Lean over a sink or basin with the blocked ear facing downward. Use a bulb syringe filled with warm (not hot or cold) water to rinse the ear several times. Use gentle pressure only.    If you are having trouble draining the water out of your ear canal, put a few drops of rubbing alcohol (isopropyl alcohol) into the ear canal. This will help remove the remaining water.    Repeat this procedure once a day for up to three days, or until your hearing is back to normal. Do not use this treatment for more than three days in a row.  Don ts    Don t use cold water to rinse the ear. This will make you dizzy.    Don t perform this procedure if you have an ear infection.    Don t perform this procedure if you have a ruptured eardrum.    Don t use cotton swabs, matches, hairpins,  keys, or other objects to  clean  the ear canal. This can cause infection of the ear canal or rupture the eardrum. Because of their size and shape, cotton swabs can push earwax deeper into the ear canal instead of removing it.  Follow-up care  Follow up with your health care provider if you are not improving after three cleaning attempts, or as advised.  When to seek medical advice  Call your health care provider right away if any of these occur:    Worsening ear pain    Fever of 101 F (38.3 C) or higher, or as directed by your health care provider    Hearing does not return to normal after three days of treatment    Fluid drainage or bleeding from the ear canal    Swelling, redness, or tenderness of the outer ear    Headache, neck pain, or stiff neck    9150-6854 [employer]. [address] [place] [address], [place] [address]. All rights reserved. This information is not intended as a substitute for professional medical care. Always follow your healthcare professional's instructions.               Ayad Anderson PA-C  [facility]    Subjective   75 year old who presents to clinic today for the following health issues:    Urgent Care and Ear Problem       HPI     Patient visits today for left sided hearing loss, ear fullness, and scant ear discharge that began 3-4 days ago. Patient denies ear pain, vertigo, or tinnitus. No recent URI symptoms or current fevers.     Review of Systems   Review of Systems   Constitutional: Negative.    HENT: Positive for ear discharge and hearing loss. Negative for ear pain, facial swelling, rhinorrhea, sinus pressure and sinus pain.    Eyes: Negative.    Respiratory: Negative.    Cardiovascular: Negative.    Gastrointestinal: Negative.    Genitourinary: Negative.    Musculoskeletal: Negative.    Neurological: Negative.    Psychiatric/Behavioral: Negative.         Objective    Temp: 98.5  F (36.9  C) Temp src: Tympanic BP: (!) 175/91 Pulse: 84     SpO2: 95 %        Physical Exam   Physical Exam  Constitutional:       General: He is not in acute distress.     Appearance: Normal appearance. He is normal weight. He is not ill-appearing, toxic-appearing or diaphoretic.   HENT:      Head: Normocephalic and atraumatic.      Right Ear: Tympanic membrane, ear canal and external ear normal. There is no impacted cerumen.      Left Ear: Tympanic membrane, ear canal and external ear normal. There is impacted cerumen.   Cardiovascular:      Rate and Rhythm: Normal rate and regular rhythm.      Pulses: Normal pulses.      Heart sounds: Normal heart sounds. No murmur. No friction rub. No gallop.    Pulmonary:      Effort: Pulmonary effort is normal. No respiratory distress.      Breath sounds: Normal breath sounds. No stridor. No wheezing, rhonchi or rales.   Chest:      Chest wall: No tenderness.   Neurological:      General: No focal deficit present.      Mental Status: He is alert and oriented to person, place, and time. Mental status is at baseline.      Gait: Gait normal.   Psychiatric:         Mood and Affect: Mood normal.         Behavior: Behavior normal.         Thought Content: Thought content normal.         Judgment: Judgment normal.

## 2021-05-22 NOTE — PATIENT INSTRUCTIONS
Patient Education       Earwax, Home Treatment    Everyone produces earwax from the lining of the ear canal. It serves to lubricate and protect the ear. The wax that forms in the canal naturally moves toward the outside of the ear and falls out. Sometimes the ear canal may contain too much wax. This can cause a blockage and loss of hearing. Directions are given below for home treatment.  Home care  If your doctor has advised you to remove a wax blockage yourself, follow these directions:    Unless a medicine was prescribed, you may use an over-the-counter product made for clearing earwax. These contain carbamide peroxide. Lie down with the blocked ear facing upward. Apply one dropper full of medicine and wait a few minutes. Grasp the outer ear and wiggle it to help the solution enter the canal.    Lean over a sink or basin with the blocked ear facing downward. Use a bulb syringe filled with warm (not hot or cold) water to rinse the ear several times. Use gentle pressure only.    If you are having trouble draining the water out of your ear canal, put a few drops of rubbing alcohol (isopropyl alcohol) into the ear canal. This will help remove the remaining water.    Repeat this procedure once a day for up to three days, or until your hearing is back to normal. Do not use this treatment for more than three days in a row.  Don ts    Don t use cold water to rinse the ear. This will make you dizzy.    Don t perform this procedure if you have an ear infection.    Don t perform this procedure if you have a ruptured eardrum.    Don t use cotton swabs, matches, hairpins, keys, or other objects to  clean  the ear canal. This can cause infection of the ear canal or rupture the eardrum. Because of their size and shape, cotton swabs can push earwax deeper into the ear canal instead of removing it.  Follow-up care  Follow up with your health care provider if you are not improving after three cleaning attempts, or as  advised.  When to seek medical advice  Call your health care provider right away if any of these occur:    Worsening ear pain    Fever of 101 F (38.3 C) or higher, or as directed by your health care provider    Hearing does not return to normal after three days of treatment    Fluid drainage or bleeding from the ear canal    Swelling, redness, or tenderness of the outer ear    Headache, neck pain, or stiff neck    [id number] [employer]. [address] [place] [address], [place] [id number]. All rights reserved. This information is not intended as a substitute for professional medical care. Always follow your healthcare professional's instructions.

## 2021-06-11 ENCOUNTER — ANCILLARY PROCEDURE (OUTPATIENT)
Dept: MRI IMAGING | Facility: CLINIC | Age: 76
End: 2021-06-11
Attending: ORTHOPAEDIC SURGERY
Payer: MEDICARE

## 2021-06-11 ENCOUNTER — ANCILLARY PROCEDURE (OUTPATIENT)
Dept: CT IMAGING | Facility: CLINIC | Age: 76
End: 2021-06-11
Attending: ORTHOPAEDIC SURGERY
Payer: MEDICARE

## 2021-06-11 DIAGNOSIS — C49.9 SARCOMA (H): ICD-10-CM

## 2021-06-11 PROCEDURE — A9585 GADOBUTROL INJECTION: HCPCS | Performed by: RADIOLOGY

## 2021-06-11 PROCEDURE — 71250 CT THORAX DX C-: CPT | Mod: GC | Performed by: RADIOLOGY

## 2021-06-11 PROCEDURE — 73720 MRI LWR EXTREMITY W/O&W/DYE: CPT | Mod: RT | Performed by: RADIOLOGY

## 2021-06-11 RX ORDER — GADOBUTROL 604.72 MG/ML
7.5 INJECTION INTRAVENOUS ONCE
Status: COMPLETED | OUTPATIENT
Start: 2021-06-11 | End: 2021-06-11

## 2021-06-11 RX ADMIN — GADOBUTROL 7.5 ML: 604.72 INJECTION INTRAVENOUS at 12:32

## 2021-06-11 NOTE — DISCHARGE INSTRUCTIONS
MRI Contrast Discharge Instructions    The IV contrast you received today will pass out of your body in your  urine. This will happen in the next 24 hours. You will not feel this process.  Your urine will not change color.    Drink at least 4 extra glasses of water or juice today (unless your doctor  has restricted your fluids). This reduces the stress on your kidneys.  You may take your regular medicines.    If you are on dialysis: It is best to have dialysis today.    If you have a reaction: Most reactions happen right away. If you have  any new symptoms after leaving the hospital (such as hives or swelling),  call your hospital at the correct number below. Or call your family doctor.  If you have breathing distress or wheezing, call 911.    Special instructions: ***    I have read and understand the above information.    Signature:______________________________________ Date:___________    Staff:__________________________________________ Date:___________     Time:__________    [facility] Departments:    ___[place]: [phone]  ___[facility]: [phone]  ___[place]: [phone] ___[facility]: [phone]  ___[facility]: [phone]  ___[facility]: [phone]  ___[phone]  ___[facility]: [phone]  ___[phone]

## 2021-10-04 ENCOUNTER — NURSE TRIAGE (OUTPATIENT)
Dept: NURSING | Facility: CLINIC | Age: 76
End: 2021-10-04

## 2021-10-05 NOTE — TELEPHONE ENCOUNTER
Patient is calling, states he knows that he has a bladder infection and he needs a medication now. He just went to [facility] urgent [facility]. He states the web site online states it was open until 9 pm, but he went there and it's closed. Advised patient that the hours changed a few months ago, and the new closing time is 8 pm. Patient sounds very frantic on the phone, states he is very uncomfortable and he needs me to page an on call doctor. Patient's PCP is [facility]. No PCP within [place].     Explained to patient I can't page on call physician from [place] because he doesn't have a primary care doctor within [place]. Patient wants to know what to do to get a prescription. Explained his two options are finding an open urgent care or going into the emergency department. Patient states if he goes into the ER there will be a long wait. Patient wanted to call his [facility] clinic. Offered to look up the phone number and give it to the patient. Patient states he is driving and can't write down a phone number.  RN did transfer patient to [facility]    Alexandrea Salguero RN/[facility]      Reason for Disposition    [1] Caller requesting a NON-URGENT new prescription or refill AND [2] triager unable to refill per unit policy    Additional Information    Negative: Drug overdose and triager unable to answer question    Negative: Caller requesting information unrelated to medicine    Negative: Caller requesting a prescription for Strep throat and has a positive culture result    Negative: Rash while taking a medication or within 3 days of stopping it    Negative: Immunization reaction suspected    Negative: [1] Asthma and [2] having symptoms of asthma (cough, wheezing, etc.)    Negative: [1] Influenza symptoms AND [2] anti-viral med prescription request, such as Tamiflu    Negative: [1] Symptom of illness (e.g., headache, abdominal pain, earache, vomiting)  "AND [2] more than mild    Negative: MORE THAN A DOUBLE DOSE of a prescription or over-the-counter (OTC) drug    Negative: [1] DOUBLE DOSE (an extra dose or lesser amount) of over-the-counter (OTC) drug AND [2] any symptoms (e.g., dizziness, nausea, pain, sleepiness)    Negative: [1] DOUBLE DOSE (an extra dose or lesser amount) of prescription drug AND [2] any symptoms (e.g., dizziness, nausea, pain, sleepiness)    Negative: Took another person's prescription drug    Negative: [1] DOUBLE DOSE (an extra dose or lesser amount) of prescription drug AND [2] NO symptoms (Exception: a double dose of antibiotics)    Negative: Diabetes drug error or overdose (e.g., took wrong type of insulin or took extra dose)    Negative: [1] Request for URGENT new prescription or refill of \"essential\" medication (i.e., likelihood of harm to patient if not taken) AND [2] triager unable to fill per unit policy    Negative: [1] Prescription not at pharmacy AND [2] was prescribed by PCP recently    Negative: [1] Pharmacy calling with prescription questions AND [2] triager unable to answer question    Negative: [1] Caller has URGENT medication question about med that PCP or specialist prescribed AND [2] triager unable to answer question    Negative: [1] Caller has NON-URGENT medication question about med that PCP prescribed AND [2] triager unable to answer question    Protocols used: MEDICATION QUESTION CALL-A-AH      "

## 2021-10-21 ENCOUNTER — OFFICE VISIT (OUTPATIENT)
Dept: URGENT CARE | Facility: URGENT CARE | Age: 76
End: 2021-10-21
Payer: MEDICARE

## 2021-10-21 VITALS
WEIGHT: 148 LBS | TEMPERATURE: 97.3 F | OXYGEN SATURATION: 96 % | DIASTOLIC BLOOD PRESSURE: 96 MMHG | BODY MASS INDEX: 20.64 KG/M2 | HEART RATE: 82 BPM | SYSTOLIC BLOOD PRESSURE: 195 MMHG

## 2021-10-21 DIAGNOSIS — R35.0 URINARY FREQUENCY: Primary | ICD-10-CM

## 2021-10-21 DIAGNOSIS — R30.0 DYSURIA: ICD-10-CM

## 2021-10-21 DIAGNOSIS — Z13.1 SCREENING FOR DIABETES MELLITUS: ICD-10-CM

## 2021-10-21 LAB
ALBUMIN UR-MCNC: ABNORMAL MG/DL
APPEARANCE UR: CLEAR
BILIRUB UR QL STRIP: NEGATIVE
COLOR UR AUTO: YELLOW
GLUCOSE BLD-MCNC: 78 MG/DL (ref 79–116)
GLUCOSE UR STRIP-MCNC: NEGATIVE MG/DL
HGB UR QL STRIP: ABNORMAL
KETONES UR STRIP-MCNC: NEGATIVE MG/DL
LEUKOCYTE ESTERASE UR QL STRIP: NEGATIVE
NITRATE UR QL: NEGATIVE
PH UR STRIP: 7 [PH] (ref 5–7)
RBC #/AREA URNS AUTO: NORMAL /HPF
SP GR UR STRIP: 1.02 (ref 1–1.03)
UROBILINOGEN UR STRIP-ACNC: 0.2 E.U./DL
WBC #/AREA URNS AUTO: NORMAL /HPF

## 2021-10-21 PROCEDURE — 36415 COLL VENOUS BLD VENIPUNCTURE: CPT | Performed by: PHYSICIAN ASSISTANT

## 2021-10-21 PROCEDURE — 82947 ASSAY GLUCOSE BLOOD QUANT: CPT | Performed by: PHYSICIAN ASSISTANT

## 2021-10-21 PROCEDURE — 81001 URINALYSIS AUTO W/SCOPE: CPT | Performed by: PHYSICIAN ASSISTANT

## 2021-10-21 PROCEDURE — 99213 OFFICE O/P EST LOW 20 MIN: CPT | Performed by: PHYSICIAN ASSISTANT

## 2021-10-22 NOTE — PROGRESS NOTES
Dysuria  - UA reflex to Microscopic and Culture  - Urine Microscopic    Urinary frequency  - Adult Urology Referral; Future  - Hemoglobin A1c; Future    20 minutes spent on the date of the encounter doing chart review, history and exam, documentation and further activities per the note     MIKAELA Duron [facility]    Subjective   75 year old who presents to clinic today for the following health issues:    Urgent Care and UTI       HPI     Genitourinary - Male  Onset/Duration: 3-4 days  Description:   Dysuria (painful urination): no}  Hematuria (blood in urine): no  Frequency: YES  Waking at night to urinate: YES  Hesitancy (delay in urine): no  Retention (unable to empty): no  Decrease in urinary flow: no  Incontinence: no  Progression of Symptoms:  same  Accompanying Signs & Symptoms:  Fever: no  Back/Flank pain: no  Urethral discharge: no  Testicle lumps/masses/pain: no  Nausea and/or vomiting: no  Abdominal pain: no  History:   History of frequent UTI s: no- Not frequent but 3 times in the past  History of kidney stones: no  History of hernias: no  Precipitating or alleviating factors: None  Therapies tried and outcome: none      Review of Systems   Review of Systems   See HPI     Objective    Temp: 97.3  F (36.3  C) Temp src: Oral BP: (!) 195/96 Pulse: 82     SpO2: 96 %       Physical Exam   Physical Exam  Constitutional:       General: He is not in acute distress.     Appearance: Normal appearance. He is normal weight. He is not ill-appearing, toxic-appearing or diaphoretic.   HENT:      Head: Normocephalic and atraumatic.   Cardiovascular:      Rate and Rhythm: Normal rate and regular rhythm.      Pulses: Normal pulses.      Heart sounds: Normal heart sounds. No murmur heard.   No friction rub. No gallop.    Pulmonary:      Effort: Pulmonary effort is normal. No respiratory distress.      Breath sounds: Normal breath sounds. No stridor. No wheezing, rhonchi or rales.   Chest:       Chest wall: No tenderness.   Abdominal:      General: Abdomen is flat. Bowel sounds are normal.      Tenderness: There is no right CVA tenderness or left CVA tenderness.   Neurological:      General: No focal deficit present.      Mental Status: He is alert and oriented to person, place, and time. Mental status is at baseline.      Gait: Gait normal.   Psychiatric:         Mood and Affect: Mood normal.         Behavior: Behavior normal.         Thought Content: Thought content normal.         Judgment: Judgment normal.          Results for orders placed or performed in visit on 10/21/21 (from the past 24 hour(s))   UA reflex to Microscopic and Culture    Specimen: Urine, Midstream   Result Value Ref Range    Color Urine Yellow Colorless, Straw, Light Yellow, Yellow    Appearance Urine Clear Clear    Glucose Urine Negative Negative mg/dL    Bilirubin Urine Negative Negative    Ketones Urine Negative Negative mg/dL    Specific Gravity Urine 1.020 1.003 - 1.035    Blood Urine Trace (A) Negative    pH Urine 7.0 5.0 - 7.0    Protein Albumin Urine Trace (A) Negative mg/dL    Urobilinogen Urine 0.2 0.2, 1.0 E.U./dL    Nitrite Urine Negative Negative    Leukocyte Esterase Urine Negative Negative   Urine Microscopic   Result Value Ref Range    RBC Urine 0-2 0-2 /HPF /HPF    WBC Urine None Seen 0-5 /HPF /HPF    Narrative    Urine Culture not indicated

## 2021-12-19 ENCOUNTER — OFFICE VISIT (OUTPATIENT)
Dept: URGENT CARE | Facility: URGENT CARE | Age: 76
End: 2021-12-19
Payer: MEDICARE

## 2021-12-19 VITALS
DIASTOLIC BLOOD PRESSURE: 98 MMHG | TEMPERATURE: 98.7 F | HEART RATE: 69 BPM | OXYGEN SATURATION: 95 % | BODY MASS INDEX: 22.12 KG/M2 | WEIGHT: 158 LBS | SYSTOLIC BLOOD PRESSURE: 170 MMHG | HEIGHT: 71 IN

## 2021-12-19 DIAGNOSIS — R30.0 DYSURIA: Primary | ICD-10-CM

## 2021-12-19 DIAGNOSIS — R31.29 MICROSCOPIC HEMATURIA: ICD-10-CM

## 2021-12-19 DIAGNOSIS — I10 PRIMARY HYPERTENSION: ICD-10-CM

## 2021-12-19 LAB
ALBUMIN UR-MCNC: 30 MG/DL
APPEARANCE UR: CLEAR
BILIRUB UR QL STRIP: NEGATIVE
COLOR UR AUTO: YELLOW
GLUCOSE UR STRIP-MCNC: NEGATIVE MG/DL
HGB UR QL STRIP: ABNORMAL
KETONES UR STRIP-MCNC: NEGATIVE MG/DL
LEUKOCYTE ESTERASE UR QL STRIP: NEGATIVE
NITRATE UR QL: NEGATIVE
PH UR STRIP: 7 [PH] (ref 5–7)
RBC #/AREA URNS AUTO: NORMAL /HPF
SP GR UR STRIP: 1.02 (ref 1–1.03)
UROBILINOGEN UR STRIP-ACNC: 0.2 E.U./DL
WBC #/AREA URNS AUTO: NORMAL /HPF

## 2021-12-19 PROCEDURE — 99213 OFFICE O/P EST LOW 20 MIN: CPT | Performed by: PHYSICIAN ASSISTANT

## 2021-12-19 PROCEDURE — 81001 URINALYSIS AUTO W/SCOPE: CPT | Performed by: PHYSICIAN ASSISTANT

## 2021-12-19 PROCEDURE — 87086 URINE CULTURE/COLONY COUNT: CPT | Performed by: PHYSICIAN ASSISTANT

## 2021-12-19 ASSESSMENT — MIFFLIN-ST. JEOR: SCORE: 1463.81

## 2021-12-20 NOTE — PATIENT INSTRUCTIONS
There is not a sign of infection in your urine today. I will culture it to ensure   Please take your BP at home and keep a journal for the next two weeks. Make an appointment with your PCP to discuss your BP.

## 2021-12-20 NOTE — PROGRESS NOTES
Assessment & Plan     1. Dysuria  No evidence of infection  Will culture to ensure infection is not present  Follow-up with PCP for a recheck for intermittent urinary urgency  - UA Macro with Reflex to Micro and Culture - lab collect; Future  - UA Macro with Reflex to Micro and Culture - lab collect  - Urine Microscopic  - Urine Culture Aerobic Bacterial - lab collect; Future    2. Primary hypertension  High readings in clinic  Advised to keep a journal of readings and discuss with PCP    3. Microscopic hematuria  Follow-up with PCP for a recheck        Return in about 1 week (around 12/26/2021) for PCP to discuss Hypertension.    Diagnosis and treatment plan was reviewed with patient and/or family.   We went over any labs or imaging. Discussed worsening symptoms or little to no relief despite treatment plan to follow-up with PCP or return to clinic.  Patient verbalizes understanding. All questions were addressed and answered.     Zunilda Espinal PA-C  [facility] [place]    CHIEF COMPLAINT:   Chief Complaint   Patient presents with     Urgent Care     Pt in clinic to have eval for urinary urgency.     Urgency     Karthik Matos is a 76 year old male who presents to clinic today for evaluation of urinary urgency. Always has frequency. No burning at this point, but feels like it does the last time he had UTIs. Denies having fever, chills, flank pain, nausea, vomiting, hematuria or weakness.     Past Medical History:   Diagnosis Date     Anxiety      COPD (chronic obstructive pulmonary disease) (H)      Emphysema lung (H)      GERD (gastroesophageal reflux disease)      Hypertension      Lipoma      Prostate cancer (H)      Past Surgical History:   Procedure Laterality Date     HERNIA REPAIR       PROSTATECTOMY SUPRAPUBIC       Social History     Tobacco Use     Smoking status: Former Smoker     Packs/day: 0.00     Smokeless tobacco: Never Used   Substance Use Topics     Alcohol use:  "Not on file     Current Outpatient Medications   Medication     coenzyme Q-10-vitamin E 50-15 MG-UNT/5ML SYRP     fluticasone-salmeterol (ADVAIR) 100-50 MCG/DOSE inhaler     latanoprost (XALATAN) 0.005 % ophthalmic solution     lisinopril (ZESTRIL) 10 MG tablet     LISINOPRIL PO     lisinopril-hydrochlorothiazide (ZESTORETIC) 20-25 MG tablet     timolol maleate (TIMOPTIC) 0.5 % ophthalmic solution     venlafaxine (EFFEXOR) 75 MG tablet     vitamin C (ASCORBIC ACID) 100 MG tablet     zinc gluconate 50 MG tablet     No current facility-administered medications for this visit.     Allergies   Allergen Reactions     Azithromycin Hives, Difficulty breathing, Other (See Comments), Shortness Of Breath and Unknown     Ibuprofen GI Disturbance and Unknown     Other reaction(s): Gastrointestinal  GI upset and spits up foam 2 hours after taking it for hours afterwards  Esophageal discomfort with oral NSAIDS  GI upset and spits up foam 2 hours after taking it for hours afterwards  Esophageal discomfort with oral NSAIDS       Omeprazole GI Disturbance, Nausea, Other (See Comments) and Unknown     Antacids  Antacids-severe gastrointestinal symptoms, pt dosebnot want them used  Antacids  Antacids-severe gastrointestinal symptoms, pt dosebnot want them used       Pantoprazole GI Disturbance and Unknown     Other reaction(s): Other (see comments)  All antacids!!!!! Severe GI intolerance, pt does not want them used  All antacids!!!!! Severe GI intolerance, pt does not want them used       Penicillins Unknown and Other (See Comments)     Pt was told as a child the penicillin didn't work for him  Pt was told as a child the penicillin didn't work for him         10 point ROS of systems were all negative except for pertinent positives noted in my HPI.      Exam:   BP (!) 170/98   Pulse 69   Temp 98.7  F (37.1  C) (Temporal)   Ht 1.803 m (5' 11\")   Wt 71.7 kg (158 lb)   SpO2 95%   BMI 22.04 kg/m    Constitutional: healthy, alert and " no distress  ENT: MMM  Cardiovascular: RRR  Respiratory: CTA bilaterally, no rhonchi or rales  Gastrointestinal: soft and nontender  Back: No CVA tenderness B/L  Skin: no rashes

## 2021-12-21 LAB — BACTERIA UR CULT: NO GROWTH

## 2022-02-08 ENCOUNTER — MEDICAL CORRESPONDENCE (OUTPATIENT)
Dept: HEALTH INFORMATION MANAGEMENT | Facility: CLINIC | Age: 77
End: 2022-02-08
Payer: MEDICARE

## 2022-03-05 ENCOUNTER — ANCILLARY PROCEDURE (OUTPATIENT)
Dept: MRI IMAGING | Facility: CLINIC | Age: 77
End: 2022-03-05
Attending: ORTHOPAEDIC SURGERY
Payer: MEDICARE

## 2022-03-05 ENCOUNTER — ANCILLARY PROCEDURE (OUTPATIENT)
Dept: CT IMAGING | Facility: CLINIC | Age: 77
End: 2022-03-05
Attending: ORTHOPAEDIC SURGERY
Payer: MEDICARE

## 2022-03-05 DIAGNOSIS — C49.9 SARCOMA (H): ICD-10-CM

## 2022-03-05 PROCEDURE — 71250 CT THORAX DX C-: CPT | Performed by: RADIOLOGY

## 2022-03-05 PROCEDURE — 73720 MRI LWR EXTREMITY W/O&W/DYE: CPT | Mod: RT | Performed by: RADIOLOGY

## 2022-03-05 PROCEDURE — A9585 GADOBUTROL INJECTION: HCPCS | Performed by: RADIOLOGY

## 2022-03-05 RX ORDER — GADOBUTROL 604.72 MG/ML
7.5 INJECTION INTRAVENOUS ONCE
Status: COMPLETED | OUTPATIENT
Start: 2022-03-05 | End: 2022-03-05

## 2022-03-05 RX ADMIN — GADOBUTROL 7 ML: 604.72 INJECTION INTRAVENOUS at 10:14

## 2022-05-22 ENCOUNTER — OFFICE VISIT (OUTPATIENT)
Dept: URGENT CARE | Facility: URGENT CARE | Age: 77
End: 2022-05-22
Payer: MEDICARE

## 2022-05-22 VITALS
SYSTOLIC BLOOD PRESSURE: 140 MMHG | RESPIRATION RATE: 24 BRPM | WEIGHT: 145 LBS | HEART RATE: 64 BPM | BODY MASS INDEX: 20.3 KG/M2 | HEIGHT: 71 IN | DIASTOLIC BLOOD PRESSURE: 86 MMHG | OXYGEN SATURATION: 94 % | TEMPERATURE: 99 F

## 2022-05-22 DIAGNOSIS — B02.9 HERPES ZOSTER WITHOUT COMPLICATION: Primary | ICD-10-CM

## 2022-05-22 PROCEDURE — 99213 OFFICE O/P EST LOW 20 MIN: CPT | Performed by: FAMILY MEDICINE

## 2022-05-22 RX ORDER — NIFEDIPINE 10 MG/1
10 CAPSULE ORAL
COMMUNITY
Start: 2022-05-06 | End: 2024-01-12 | Stop reason: DRUGHIGH

## 2022-05-22 RX ORDER — VALACYCLOVIR HYDROCHLORIDE 1 G/1
1000 TABLET, FILM COATED ORAL 3 TIMES DAILY
Qty: 21 TABLET | Refills: 0 | Status: SHIPPED | OUTPATIENT
Start: 2022-05-22 | End: 2022-05-26

## 2022-05-22 RX ORDER — CITALOPRAM HYDROBROMIDE 20 MG/1
20 TABLET ORAL EVERY MORNING
COMMUNITY
Start: 2022-05-06 | End: 2023-11-28

## 2022-05-22 NOTE — PROGRESS NOTES
"      (B02.9) Herpes zoster without complication  (primary encounter diagnosis)  Comment:   Classic mild eruption on upper left chest over to back.  Plan: valACYclovir (VALTREX) 1000 mg tablet        Patient advised.  Information provided.      HISTORY    2-day history of rash upper right chest.  Somewhat itchy but also a burning quality.      REVIEW OF SYSTEMS    No fever.  No cough or short of breath.  No exertional chest pain.      EXAM  BP (!) 140/86   Pulse 64   Temp 99  F (37.2  C) (Temporal)   Resp 24   Ht 1.803 m (5' 11\")   Wt 65.8 kg (145 lb)   SpO2 94%   BMI 20.22 kg/m      Mild, typical shingles eruption upper right chest beneath axilla over toward right scapula.  Quite classic for shingles.      "

## 2022-05-26 DIAGNOSIS — B02.9 HERPES ZOSTER WITHOUT COMPLICATION: ICD-10-CM

## 2022-05-26 RX ORDER — VALACYCLOVIR HYDROCHLORIDE 1 G/1
1000 TABLET, FILM COATED ORAL 3 TIMES DAILY
Qty: 21 TABLET | Refills: 0 | Status: SHIPPED | OUTPATIENT
Start: 2022-05-26

## 2023-04-15 ENCOUNTER — OFFICE VISIT (OUTPATIENT)
Dept: URGENT CARE | Facility: URGENT CARE | Age: 78
End: 2023-04-15
Payer: MEDICARE

## 2023-04-15 VITALS
BODY MASS INDEX: 20.04 KG/M2 | HEART RATE: 68 BPM | HEIGHT: 70 IN | OXYGEN SATURATION: 94 % | SYSTOLIC BLOOD PRESSURE: 153 MMHG | WEIGHT: 140 LBS | DIASTOLIC BLOOD PRESSURE: 78 MMHG | TEMPERATURE: 98.2 F

## 2023-04-15 DIAGNOSIS — R20.2 PARESTHESIAS: ICD-10-CM

## 2023-04-15 DIAGNOSIS — Z86.718 PERSONAL HISTORY OF DVT (DEEP VEIN THROMBOSIS): ICD-10-CM

## 2023-04-15 DIAGNOSIS — R20.9 BILATERAL COLD FEET: Primary | ICD-10-CM

## 2023-04-15 PROCEDURE — 99214 OFFICE O/P EST MOD 30 MIN: CPT | Performed by: PHYSICIAN ASSISTANT

## 2023-04-15 NOTE — PROGRESS NOTES
"SUBJECTIVE:  No chief complaint on file.    Carlo Pacheco is a 77 year old male presents with a chief complaint of persisting bilateral foot pain, paresthesias and coldness.  Baseline symptoms have worsened over the last few days.  History of COPD, EMphysema    Past Medical History:   Diagnosis Date     Anxiety      COPD (chronic obstructive pulmonary disease) (H)      Emphysema lung (H)      GERD (gastroesophageal reflux disease)      Hypertension      Lipoma      Prostate cancer (H)      Current Outpatient Medications   Medication Sig Dispense Refill     citalopram (CELEXA) 20 MG tablet Take 20 mg by mouth every morning       coenzyme Q-10-vitamin E 50-15 MG-UNT/5ML SYRP Take 5 mLs by mouth       fluticasone-salmeterol (ADVAIR) 100-50 MCG/DOSE inhaler Inhale 1 puff into the lungs       latanoprost (XALATAN) 0.005 % ophthalmic solution        lisinopril (ZESTRIL) 10 MG tablet Take 20 mg by mouth       LISINOPRIL PO Take 10 mg by mouth       lisinopril-hydrochlorothiazide (ZESTORETIC) 20-25 MG tablet Take 1 tablet by mouth       NIFEdipine (PROCARDIA) 10 MG capsule Take 10 mg by mouth       timolol maleate (TIMOPTIC) 0.5 % ophthalmic solution        valACYclovir (VALTREX) 1000 mg tablet Take 1 tablet (1,000 mg) by mouth 3 times daily 21 tablet 0     venlafaxine (EFFEXOR) 75 MG tablet Take 75 mg by mouth       vitamin C (ASCORBIC ACID) 100 MG tablet Take 1,000 mg by mouth       zinc gluconate 50 MG tablet Take 50 mg by mouth       Social History     Tobacco Use     Smoking status: Former     Packs/day: 0.00     Types: Cigarettes     Smokeless tobacco: Never   Vaping Use     Vaping status: Not on file   Substance Use Topics     Alcohol use: Not on file       ROS:  Review of systems negative except as stated above.    EXAM:   BP (!) 153/78   Pulse 68   Temp 98.2  F (36.8  C) (Temporal)   Ht 1.778 m (5' 10\")   Wt 63.5 kg (140 lb)   SpO2 94%   BMI 20.09 kg/m    Gen: healthy,alert,no distress  Extremity: cool " dusky feet bilaterally  CARD: unable to palpate pedal pulses, sluggish refill  CHEST: no labored breathing  NEURO: Normal strength and tone, sensory exam grossly normal, mentation intact and speech normal      ASSESSMENT:   (R20.9) Bilateral cold feet  (primary encounter diagnosis)  (R20.2) Paresthesias  (Z86.718) Personal history of DVT (deep vein thrombosis)  Comment: unable to assess circulation/clots  Plan: to ED for assessment

## 2023-05-19 ENCOUNTER — ANCILLARY PROCEDURE (OUTPATIENT)
Dept: GENERAL RADIOLOGY | Facility: CLINIC | Age: 78
End: 2023-05-19
Attending: ORTHOPAEDIC SURGERY
Payer: MEDICARE

## 2023-05-19 ENCOUNTER — ANCILLARY PROCEDURE (OUTPATIENT)
Dept: MRI IMAGING | Facility: CLINIC | Age: 78
End: 2023-05-19
Attending: ORTHOPAEDIC SURGERY
Payer: MEDICARE

## 2023-05-19 ENCOUNTER — ANCILLARY PROCEDURE (OUTPATIENT)
Dept: CT IMAGING | Facility: CLINIC | Age: 78
End: 2023-05-19
Attending: ORTHOPAEDIC SURGERY
Payer: MEDICARE

## 2023-05-19 DIAGNOSIS — C49.9 SARCOMA (H): ICD-10-CM

## 2023-05-19 PROCEDURE — 71250 CT THORAX DX C-: CPT | Performed by: RADIOLOGY

## 2023-05-19 PROCEDURE — 73720 MRI LWR EXTREMITY W/O&W/DYE: CPT | Mod: RT | Performed by: RADIOLOGY

## 2023-05-19 PROCEDURE — 73552 X-RAY EXAM OF FEMUR 2/>: CPT | Mod: RT | Performed by: RADIOLOGY

## 2023-05-19 PROCEDURE — A9585 GADOBUTROL INJECTION: HCPCS | Performed by: RADIOLOGY

## 2023-05-19 RX ORDER — GADOBUTROL 604.72 MG/ML
7.5 INJECTION INTRAVENOUS ONCE
Status: COMPLETED | OUTPATIENT
Start: 2023-05-19 | End: 2023-05-19

## 2023-05-19 RX ADMIN — GADOBUTROL 6.5 ML: 604.72 INJECTION INTRAVENOUS at 13:35

## 2023-08-31 ENCOUNTER — TRANSFERRED RECORDS (OUTPATIENT)
Dept: HEALTH INFORMATION MANAGEMENT | Facility: CLINIC | Age: 78
End: 2023-08-31
Payer: MEDICARE

## 2023-09-05 ENCOUNTER — TRANSCRIBE ORDERS (OUTPATIENT)
Dept: OTHER | Age: 78
End: 2023-09-05

## 2023-09-05 DIAGNOSIS — K59.00 CONSTIPATION, UNSPECIFIED CONSTIPATION TYPE: ICD-10-CM

## 2023-09-05 DIAGNOSIS — R14.0 BLOATING: Primary | ICD-10-CM

## 2023-09-08 ENCOUNTER — THERAPY VISIT (OUTPATIENT)
Dept: PHYSICAL THERAPY | Facility: CLINIC | Age: 78
End: 2023-09-08
Payer: MEDICARE

## 2023-09-08 DIAGNOSIS — R14.0 BLOATING: ICD-10-CM

## 2023-09-08 DIAGNOSIS — K59.00 CONSTIPATION, UNSPECIFIED CONSTIPATION TYPE: Primary | ICD-10-CM

## 2023-09-08 PROCEDURE — 97110 THERAPEUTIC EXERCISES: CPT | Mod: GP | Performed by: PHYSICAL THERAPIST

## 2023-09-08 PROCEDURE — 97535 SELF CARE MNGMENT TRAINING: CPT | Mod: GP | Performed by: PHYSICAL THERAPIST

## 2023-09-08 PROCEDURE — 97161 PT EVAL LOW COMPLEX 20 MIN: CPT | Mod: GP | Performed by: PHYSICAL THERAPIST

## 2023-09-08 PROCEDURE — 97530 THERAPEUTIC ACTIVITIES: CPT | Mod: GP | Performed by: PHYSICAL THERAPIST

## 2023-09-08 NOTE — PROGRESS NOTES
PHYSICAL THERAPY EVALUATION  Type of Visit: Evaluation    See electronic medical record for Abuse and Falls Screening details.    Subjective       Presenting condition or subjective complaint:  Constipation approx 1 year onset.  Has medical issues that may contribute: esophageal constriction that requires intermittant stretching/botox with limited ability to eat and decreased water intake.  A recent cleanse with Miralax was successful but he does not perfrom daily.  Describes straining, and urination dysfunction requiring a pad/Depends.  Date of onset: 23    Relevant medical history:   Prostate removed    Prior diagnostic imaging/testing results:     Normal colonoscopy  Prior therapy history for the same diagnosis, illness or injury:    No    Hobbies/Interests:  working out at [employer]    Patient goals for therapy:  dec constipation      Objective      PELVIC EVALUATION  Other issues:  Incomplete bladder emptying and dribbling after urination0  Number of bladder infections in last 12 months:    Fluid intake per day:      24 ounces of coffee  Medications taken for bladder:     `no  Activities causing urine leak:    rushing  Amount of urine typically leaked:  small drops  Pads used to help with leakin depends    Bowel History:  Frequency of bowel movement:  1-2 per day  Consistency of stool:    hard to soft  Ignores the urge to defecate:  no  Other bowel issues:  straining  Length of time spent trying to have a bowel movement:  10 min         Discussed reason for referral regarding pelvic health needs and external/internal pelvic floor muscle examination with patient/guardian.  Opportunity provided to ask questions and verbal consent for assessment and intervention was given.    PAIN: no pain  POSTURE: Sitting Posture: Lordosis decreased, sig posterior pelvic tilt  LUMBAR SCREEN:  dec lumbar and hip extension  PELVIC EXAM  External Visual Inspection: Next      Integumentary: Next      External  Digital Palpation per Perineum: Next    ABDOMINAL ASSESSMENT  Diastasis Rectus Abdominis (VITO):  VITO presence: Yes    Abdominal Activation/Strength:  Belly bulge for stability with valsalva, No TrA recruitment    BIOFEEDBACK: next    Observation: pt with valsalva, poor relaxation and strain with toilet manuever  Assessment & Plan   CLINICAL IMPRESSIONS  Medical Diagnosis: Constipation    Treatment Diagnosis: Constipation   Impression/Assessment: Patient is a 77 year old male with Constipation complaints.  The following significant findings have been identified: Decreased strength, Decreased proprioception, and Impaired muscle performance. These impairments interfere with their ability to perform self care tasks, recreational activities, driving , and community mobility as compared to previous level of function.     Clinical Decision Making (Complexity):  Clinical Presentation: Stable/Uncomplicated  Clinical Presentation Rationale: based on medical and personal factors listed in PT evaluation  Clinical Decision Making (Complexity): Low complexity    PLAN OF CARE  Treatment Interventions:  Modalities: Biofeedback, Ultrasound  Interventions: Manual Therapy, Neuromuscular Re-education, Therapeutic Activity, Therapeutic Exercise, Self-Care/Home Management    Long Term Goals     PT Goal 1  Goal Identifier: Self-Management Pelvic  Goal Description: In 6 weeks patient will verbalize/demonstrate pro-active self-care measures to improve pelvic floor function including bladder/bowel hygiene; activities to reduce stress and anxiety; and prescribed exercises for pelvic floor function with the assistance of educational materials from physical therapy sessions.  Rationale: to maximize safety and independence with self cares;to maximize safety and independence with performance of ADLs and functional tasks  Target Date: 10/20/23  PT Goal 4  Goal Identifier: Voiding Dysfunction  Goal Description: In 6 weeks the patient will improve  urinary voiding function by increasing their ability to fully empty their bladder and bowel during each void,using void techniques if needed, measured by decrease in post-void dribbling and frequency.  Rationale: to maximize safety and independence with performance of ADLs and functional tasks;to maximize safety and independence with self cares  Target Date: 10/20/23  PT Goal 7  Goal Identifier: Pelvic Floor Coordination  Goal Description: In 12 weeks patient will demonstrate improvement in pelvic floor muscle coordination thorugh observation of sEMG or external visualization of PFM contraction/relaxation/bulge to assist with decrease in pain and dysfunction.  Rationale: to maximize safety and independence with performance of ADLs and functional tasks;to maximize safety and independence with self cares  Target Date: 12/01/23      Frequency of Treatment: 1x/week every 2 weeks  Duration of Treatment: 12 weeks    Recommended Referrals to Other Professionals: Physical Therapy  Education Assessment:   Learner/Method: Patient;Pictures/Video;Demonstration    Risks and benefits of evaluation/treatment have been explained.   Patient/Family/caregiver agrees with Plan of Care.     Evaluation Time:     PT Eval, Low Complexity Minutes (08240): 20       Signing Clinician: Naina Nguyễn, JANIS      [facility]                                                                                   OUTPATIENT PHYSICAL THERAPY      PLAN OF TREATMENT FOR OUTPATIENT REHABILITATION   Patient's Last Name, First Name, Carlo Roche YOB: 1945   Provider's Name   [facility]   Medical Record No.  [id number]     Onset Date: 09/05/23  Start of Care Date: 09/08/23     Medical Diagnosis:  Constipation      PT Treatment Diagnosis:  Constipation Plan of Treatment  Frequency/Duration: 1x/week every 2 weeks/ 12 weeks    Certification date from 09/08/23 to 12/01/23          See note for plan of treatment details and functional goals     Naina Nguyễn PT                         I CERTIFY THE NEED FOR THESE SERVICES FURNISHED UNDER        THIS PLAN OF TREATMENT AND WHILE UNDER MY CARE     (Physician attestation of this document indicates review and certification of the therapy plan).                Referring Provider:  Christina Beasley      Initial Assessment  See Epic Evaluation- Start of Care Date: 09/08/23

## 2023-09-12 ENCOUNTER — THERAPY VISIT (OUTPATIENT)
Dept: PHYSICAL THERAPY | Facility: CLINIC | Age: 78
End: 2023-09-12
Payer: MEDICARE

## 2023-09-12 DIAGNOSIS — K59.00 CONSTIPATION, UNSPECIFIED CONSTIPATION TYPE: Primary | ICD-10-CM

## 2023-09-12 PROCEDURE — 97530 THERAPEUTIC ACTIVITIES: CPT | Mod: GP | Performed by: PHYSICAL THERAPIST

## 2023-09-12 PROCEDURE — 97110 THERAPEUTIC EXERCISES: CPT | Mod: GP | Performed by: PHYSICAL THERAPIST

## 2023-10-07 ENCOUNTER — HEALTH MAINTENANCE LETTER (OUTPATIENT)
Age: 78
End: 2023-10-07

## 2023-11-28 ENCOUNTER — OFFICE VISIT (OUTPATIENT)
Dept: FAMILY MEDICINE | Facility: CLINIC | Age: 78
End: 2023-11-28
Payer: MEDICARE

## 2023-11-28 VITALS
WEIGHT: 152.4 LBS | TEMPERATURE: 97.6 F | DIASTOLIC BLOOD PRESSURE: 70 MMHG | HEART RATE: 68 BPM | BODY MASS INDEX: 21.82 KG/M2 | OXYGEN SATURATION: 97 % | SYSTOLIC BLOOD PRESSURE: 132 MMHG | RESPIRATION RATE: 16 BRPM | HEIGHT: 70 IN

## 2023-11-28 DIAGNOSIS — L20.9 ATOPIC DERMATITIS, UNSPECIFIED TYPE: Primary | ICD-10-CM

## 2023-11-28 PROCEDURE — 99213 OFFICE O/P EST LOW 20 MIN: CPT

## 2023-11-28 RX ORDER — BISACODYL 10 MG
10 SUPPOSITORY, RECTAL RECTAL
COMMUNITY
Start: 2022-12-05

## 2023-11-28 RX ORDER — ALBUTEROL SULFATE 90 UG/1
2 AEROSOL, METERED RESPIRATORY (INHALATION)
COMMUNITY
Start: 2023-07-10

## 2023-11-28 RX ORDER — ACETAMINOPHEN 500 MG
1000 TABLET ORAL
COMMUNITY

## 2023-11-28 RX ORDER — AMOXICILLIN 875 MG
TABLET ORAL
COMMUNITY
Start: 2023-11-01 | End: 2024-01-12

## 2023-11-28 RX ORDER — RESPIRATORY SYNCYTIAL VIRUS VACCINE 120MCG/0.5
0.5 KIT INTRAMUSCULAR ONCE
Qty: 1 EACH | Refills: 0 | Status: CANCELLED | OUTPATIENT
Start: 2023-11-28 | End: 2023-11-28

## 2023-11-28 RX ORDER — TRIAMCINOLONE ACETONIDE 1 MG/G
CREAM TOPICAL 2 TIMES DAILY
Qty: 30 G | Refills: 0 | Status: SHIPPED | OUTPATIENT
Start: 2023-11-28 | End: 2023-12-12

## 2023-11-28 RX ORDER — GABAPENTIN 100 MG/1
CAPSULE ORAL
COMMUNITY
Start: 2023-05-03

## 2023-11-28 ASSESSMENT — PAIN SCALES - GENERAL: PAINLEVEL: NO PAIN (0)

## 2023-11-28 NOTE — PROGRESS NOTES
Assessment & Plan     (L20.9) Atopic dermatitis, unspecified type  (primary encounter diagnosis)  Comment: Acute.  Erythematous maculopapular rash that is pruritic with a history of seasonal allergies, makes this rash very likely to be eczema.  The location of the rash is not necessarily in line with where normal eczematous rash would appear.  There is also likelihood that this is an atopic contact dermatitis related to allergens on close that have previously been in storage.  Rash responding well to topical steroid cream.  Will continue with this treatment regimen.  Patient does have a follow-up visit with dermatology in 1 week, so this will be a good time to reassess the status of the rash.  No concern at this time for infection.  Some possibility that this is an allergic dermatitis, so if current plan of care is not successful may consider incorporating antihistamine to manage rash, and removing anything from environment that seems to be exacerbating the rash.  No concern for more malicious etiology at this time, as patient does not present with any other systemic symptoms.  Plan: triamcinolone (KENALOG) 0.1 % external cream        -Apply twice a day to affected areas  -Monitor for signs of infection or worsening rash    I spent a total of 20 minutes on the day of the visit.   Time spent by me doing chart review, history and exam, documentation and further activities per the note       FUTURE APPOINTMENTS:       - Follow-up visit in 1 week with dermatology       - Follow-up for annual visit or as needed  Patient Instructions   Your rash is likely either due to a skin condition called eczema, or a rash related to coming in contact with something that irritated your skin.  Both of these are well treated with a topical steroid cream.  I have represcribed this medication, so that you are not using  medication on the rash.  You should apply this cream twice a day only to the affected areas until the rash is  cleared up.    Continue to wash your skin with warm water and gentle scent free soaps.  Pat dry, but avoid drying out the area too much.     Avoid scratching at the area, as the skin opened up the rash and increase your risk for skin infection.    Monitor for symptoms including fever, worsening rash, rash that is open and weeping, fatigue, chest pain, or shortness of breath.  All of the symptoms would warrant a need for seeking immediate medical attention    SERJIO Garrido [facility]    Karthik Matos is a 78 year old, presenting for the following health issues:  Derm Problem (Rash - back of neck, across upper back (shoulder) and chest. )        11/28/2023    10:07 AM   Additional Questions   Roomed by Brianna Matos is a 78-year-old male with a significant past medical history who presents today with concerns for 2 days of rash on his chest and back.  Patient notes that he recently took some old close out of storage as the seasons are changing, and does report that he washed all the clothes before wearing them.  He notes that a couple of days ago he began to have itchy bumps on his back, shoulders, and chest, and these have progressively become more itchy since.  Patient reports that though bumps are itchy, they are not painful, draining, open sores, burning, or crusting lesions.  He reports that he has had a concern about this in the past which she has seen dermatology for, and has previously used triamcinolone cream to treat his concern.  Patient reports that he did have some triamcinolone cream leftover, and he began using this about 1 day ago.  The triamcinolone cream has helped with the itching, but the rash is still present.  He reports that the bumps are small and generally skin colored, but when he itches them they become very red and inflamed.  Eyes any fever, chills, chest pain, shortness of breath, cough, runny nose, or ear pain.  He denies any new allergens  "or coming in contact with new detergents, new lotions, new body washes, or new foods.  He denies concern for animal bites or bug bites.  He denies any recent travel.    History of Present Illness       Reason for visit:  Rash  Symptom onset:  1-3 days ago  Symptoms include:  Itching across sholders across back and on chest  Symptom intensity:  Moderate  Symptom progression:  Staying the same  Had these symptoms before:  Yes  Has tried/received treatment for these symptoms:  Yes  Previous treatment was successful:  Yes  Prior treatment description:  Triamcinolone cetonide cream  What makes it worse:  Shirt on  What makes it better:  Triamcinolone cream    He eats 0-1 servings of fruits and vegetables daily.He consumes 2 sweetened beverage(s) daily.He exercises with enough effort to increase his heart rate 60 or more minutes per day.  He exercises with enough effort to increase his heart rate 6 days per week.   He is taking medications regularly.         Review of Systems   Constitutional, HEENT, cardiovascular, pulmonary, gi and gu systems are negative, except as otherwise noted.      Objective    /70 (BP Location: Left arm, Patient Position: Sitting, Cuff Size: Adult Regular)   Pulse 68   Temp 97.6  F (36.4  C) (Tympanic)   Resp 16   Ht 1.778 m (5' 10\")   Wt 69.1 kg (152 lb 6.4 oz)   SpO2 97%   BMI 21.87 kg/m    Body mass index is 21.87 kg/m .  Physical Exam   GENERAL: healthy, alert and no distress  EYES: Eyes grossly normal to inspection, PERRL and conjunctivae and sclerae normal  NECK: no adenopathy, no asymmetry, masses, or scars and thyroid normal to palpation  RESP: lungs clear to auscultation - no rales, rhonchi or wheezes  CV: regular rate and rhythm, normal S1 S2, no S3 or S4, no murmur, click or rub, no peripheral edema and peripheral pulses strong  SKIN: Erythematous maculopapular rash across superior aspect of back, across shoulders bilaterally, and in 6 inch diameter patch over sternum.  No " uniform pattern noted.  Rash does not seem to be in clusters.  Skin is erythematous and inflamed surrounding the rash.  NEURO: Normal strength and tone, mentation intact and speech normal    Pauline Marks DNP FNP-C  Family Nurse Practitioner - Same Day Provider  [facility] [place]

## 2023-11-28 NOTE — PATIENT INSTRUCTIONS
Your rash is likely either due to a skin condition called eczema, or a rash related to coming in contact with something that irritated your skin.  Both of these are well treated with a topical steroid cream.  I have represcribed this medication, so that you are not using  medication on the rash.  You should apply this cream twice a day only to the affected areas until the rash is cleared up.    Continue to wash your skin with warm water and gentle scent free soaps.  Pat dry, but avoid drying out the area too much.     Avoid scratching at the area, as the skin opened up the rash and increase your risk for skin infection.    Monitor for symptoms including fever, worsening rash, rash that is open and weeping, fatigue, chest pain, or shortness of breath.  All of the symptoms would warrant a need for seeking immediate medical attention

## 2023-12-30 ENCOUNTER — VIRTUAL VISIT (OUTPATIENT)
Dept: URGENT CARE | Facility: CLINIC | Age: 78
End: 2023-12-30
Payer: MEDICARE

## 2023-12-30 DIAGNOSIS — R30.0 DYSURIA: Primary | ICD-10-CM

## 2023-12-30 PROCEDURE — 99443 PR PHYSICIAN TELEPHONE EVALUATION 21-30 MIN: CPT | Mod: 95

## 2023-12-30 RX ORDER — SULFAMETHOXAZOLE/TRIMETHOPRIM 800-160 MG
1 TABLET ORAL 2 TIMES DAILY
Qty: 14 TABLET | Refills: 0 | Status: SHIPPED | OUTPATIENT
Start: 2023-12-30 | End: 2024-01-06

## 2023-12-30 NOTE — PROGRESS NOTES
"Carlo is a 78 year old who is being evaluated via a billable telephone visit.    Assessment & Plan     (R30.0) Dysuria  (primary encounter diagnosis)    Plan: sulfamethoxazole-trimethoprim (BACTRIM DS)         800-160 MG tablet  Will home treat and monitor sx  Push fluids if new or worsening to clinic to leave urine and be examined      SERJIO Mendoza [facility]  Virtual Urgent Care  [facility] VIRTUAL URGENT CARE    Subjective   Carlo is a 78 year old, presenting for the following health issues:  UTI    HPI     Sx of burning urination since 3 am, urgency and frequency  No blood in urine fever or chills lower abdominal pain back pain  As had hx of uti's has been a \"while\" a year or more ago  No prostate  Drank water and aspirin didn't help     Objective           Vitals:  No vitals were obtained today due to virtual visit.    Physical Exam   GENERAL: Healthy, alert and no distress  EYES: Eyes grossly normal to inspection.  No discharge or erythema, or obvious scleral/conjunctival abnormalities.  RESP: No audible wheeze, cough, or visible cyanosis.  No visible retractions or increased work of breathing.    SKIN: Visible skin clear. No significant rash, abnormal pigmentation or lesions.  NEURO: Cranial nerves grossly intact.  Mentation and speech appropriate for age.  PSYCH: Mentation appears normal, affect normal/bright, judgement and insight intact, normal speech and appearance well-groomed.    Telephone time spent 22 minutes      "

## 2024-01-12 ENCOUNTER — OFFICE VISIT (OUTPATIENT)
Dept: URGENT CARE | Facility: URGENT CARE | Age: 79
End: 2024-01-12
Payer: MEDICARE

## 2024-01-12 ENCOUNTER — TELEPHONE (OUTPATIENT)
Dept: FAMILY MEDICINE | Facility: CLINIC | Age: 79
End: 2024-01-12
Payer: MEDICARE

## 2024-01-12 VITALS
DIASTOLIC BLOOD PRESSURE: 82 MMHG | OXYGEN SATURATION: 96 % | HEART RATE: 61 BPM | TEMPERATURE: 96.9 F | SYSTOLIC BLOOD PRESSURE: 147 MMHG

## 2024-01-12 DIAGNOSIS — R31.0 GROSS HEMATURIA: Primary | ICD-10-CM

## 2024-01-12 DIAGNOSIS — R39.15 URINARY URGENCY: ICD-10-CM

## 2024-01-12 PROBLEM — K22.3: Status: ACTIVE | Noted: 2020-01-31

## 2024-01-12 PROBLEM — Z96.89 PRESENCE OF OTHER SPECIFIED FUNCTIONAL IMPLANTS: Status: ACTIVE | Noted: 2020-02-05

## 2024-01-12 PROBLEM — R12 HEARTBURN: Status: ACTIVE | Noted: 2020-02-05

## 2024-01-12 PROBLEM — K22.0 ACHALASIA OF ESOPHAGUS: Status: ACTIVE | Noted: 2020-02-05

## 2024-01-12 PROBLEM — Z79.4 ENCOUNTER FOR LONG-TERM (CURRENT) USE OF INSULIN (H): Status: ACTIVE | Noted: 2021-03-18

## 2024-01-12 PROBLEM — Z86.718 HISTORY OF DVT (DEEP VEIN THROMBOSIS): Status: ACTIVE | Noted: 2020-09-17

## 2024-01-12 PROBLEM — L20.84 INTRINSIC ECZEMA: Status: ACTIVE | Noted: 2022-03-02

## 2024-01-12 PROBLEM — Z93.1 GASTROSTOMY STATUS (H): Status: ACTIVE | Noted: 2020-02-05

## 2024-01-12 PROBLEM — C49.21: Status: ACTIVE | Noted: 2020-02-05

## 2024-01-12 PROBLEM — F41.9 ANXIETY DISORDER, UNSPECIFIED: Status: ACTIVE | Noted: 2020-02-05

## 2024-01-12 PROBLEM — J44.9 COPD (CHRONIC OBSTRUCTIVE PULMONARY DISEASE) (H): Status: ACTIVE | Noted: 2020-02-05

## 2024-01-12 PROBLEM — Z86.0100 HISTORY OF COLON POLYPS: Status: ACTIVE | Noted: 2023-01-17

## 2024-01-12 PROBLEM — L89.311 PRESSURE INJURY OF RIGHT BUTTOCK, STAGE 1: Status: ACTIVE | Noted: 2021-08-30

## 2024-01-12 LAB
ALBUMIN UR-MCNC: NEGATIVE MG/DL
APPEARANCE UR: CLEAR
BACTERIA #/AREA URNS HPF: ABNORMAL /HPF
BILIRUB UR QL STRIP: NEGATIVE
COLOR UR AUTO: YELLOW
GLUCOSE UR STRIP-MCNC: NEGATIVE MG/DL
HGB UR QL STRIP: ABNORMAL
KETONES UR STRIP-MCNC: NEGATIVE MG/DL
LEUKOCYTE ESTERASE UR QL STRIP: NEGATIVE
NITRATE UR QL: NEGATIVE
PH UR STRIP: 5.5 [PH] (ref 5–7)
RBC #/AREA URNS AUTO: ABNORMAL /HPF
SP GR UR STRIP: 1.02 (ref 1–1.03)
SQUAMOUS #/AREA URNS AUTO: ABNORMAL /LPF
UROBILINOGEN UR STRIP-ACNC: 0.2 E.U./DL
WBC #/AREA URNS AUTO: ABNORMAL /HPF

## 2024-01-12 PROCEDURE — 99213 OFFICE O/P EST LOW 20 MIN: CPT | Performed by: STUDENT IN AN ORGANIZED HEALTH CARE EDUCATION/TRAINING PROGRAM

## 2024-01-12 PROCEDURE — 81001 URINALYSIS AUTO W/SCOPE: CPT | Performed by: STUDENT IN AN ORGANIZED HEALTH CARE EDUCATION/TRAINING PROGRAM

## 2024-01-12 RX ORDER — NIFEDIPINE 30 MG/1
30 TABLET, EXTENDED RELEASE ORAL DAILY
COMMUNITY
Start: 2023-11-04

## 2024-01-12 NOTE — TELEPHONE ENCOUNTER
Per Dr Cornejo, pt needs to be seen if still having symptoms. Informed pt, he declined. Advised he could reach out to primary if he wants to try and have them order a UA only  Karen Sweet, CMA

## 2024-01-12 NOTE — TELEPHONE ENCOUNTER
If he completed treatment and is still having symptoms, he needs to be seen for a visit.  They will collect a urine sample at that time.    Thanks,  Tracie Cornejo,        Called pt and relayed message. Pt verbalized understanding.      Chaka Montes De Oca, BSN RN  [facility]

## 2024-01-12 NOTE — TELEPHONE ENCOUNTER
Pt called stating that he was seen on 12/30/2023 at Telephone urgent care regarding UTI symptoms.    Pt stated they advised him to have a urine analysis and culture done and he went in yesterday but they were unable to do any labs as there were no orders. Pt completed treatment and Symptoms are way better than before. But still has a slight burning sensation with urination. He stated he feels almost completely better but still a little bit off; not back to normal yet.    Pt would like to have labs done. Please place orders in epic. Please call pt when orders are in.      Chaka Aviles Cem Say, BSN RN  [facility]

## 2024-01-13 NOTE — PROGRESS NOTES
Assessment & Plan     Urinary urgency  - UA Macroscopic with reflex to Microscopic and Culture  - UA Microscopic with Reflex to Culture    Gross hematuria  - Adult Urology  Referral    Medical Decision Making  78-year-old male with a history of radical prostatectomy s/p prostate cancer, sarcoma of R leg and past tobacco use presenting with urinary urgency for 1mo. UA negative for infection, + gross hematuria without microscopic hematuria. Low concern for kidney stones, nor sexually active. Ddx: urge incontinence, medications (pt not on any anticholinergic, selective serotonin reuptake inhibitor, or diuretic), functional incontinence, cystitis, bladder stones. In the setting of his multiple prior cancers and tobacco use, would also be concerned for bladder malignancy. After discussion with patient and daughter, he would like to follow up with urology which I think is reasonable. ED precautions provided and patient understanding of the plan at the time of discharge.     Return for In clinic with your primary care provider.    Tracie Cornejo, DO  she/her  [facility] URGENT CARE    Subjective     Carlo Pacheco is a 78 year old male who presents to clinic today for the following health issues:    HPI    Treated in virtual visit with course of Bactrim, increased fluids. No UA from that time  He reports completing course of antibiotic and still felt like he had some urgency.  No dysuria   Not currently sexually active  H/o prostate cancer s/p prostatectomy, urge and stress incontinence with ongoing dripping  No penile lesions, discharge    Quit smoking 8 years ago     Past Medical History:   Diagnosis Date    Anxiety     COPD (chronic obstructive pulmonary disease) (H)     Emphysema lung (H)     GERD (gastroesophageal reflux disease)     Hypertension     Lipoma     Prostate cancer (H)        Allergies   Allergen Reactions    Azithromycin Hives, Difficulty breathing, Other (See Comments), Shortness Of  Breath and Unknown    Ibuprofen GI Disturbance and Unknown     Other reaction(s): Gastrointestinal  GI upset and spits up foam 2 hours after taking it for hours afterwards  Esophageal discomfort with oral NSAIDS  GI upset and spits up foam 2 hours after taking it for hours afterwards  Esophageal discomfort with oral NSAIDS      Omeprazole GI Disturbance, Nausea, Other (See Comments) and Unknown     Antacids  Antacids-severe gastrointestinal symptoms, pt dosebnot want them used  Antacids  Antacids-severe gastrointestinal symptoms, pt dosebnot want them used      Pantoprazole GI Disturbance and Unknown     Other reaction(s): Other (see comments)  All antacids!!!!! Severe GI intolerance, pt does not want them used  All antacids!!!!! Severe GI intolerance, pt does not want them used      Penicillins Unknown and Other (See Comments)     Pt was told as a child the penicillin didn't work for him  Pt was told as a child the penicillin didn't work for him       Current Outpatient Medications   Medication    NIFEdipine ER OSMOTIC (PROCARDIA XL) 30 MG 24 hr tablet    acetaminophen (TYLENOL) 500 MG tablet    albuterol (PROAIR HFA/PROVENTIL HFA/VENTOLIN HFA) 108 (90 Base) MCG/ACT inhaler    bisacodyl (DULCOLAX) 10 MG suppository    fluticasone-salmeterol (ADVAIR) 100-50 MCG/DOSE inhaler    gabapentin (NEURONTIN) 100 MG capsule    latanoprost (XALATAN) 0.005 % ophthalmic solution    valACYclovir (VALTREX) 1000 mg tablet    vitamin B-12 (CYANOCOBALAMIN) 250 MCG tablet    vitamin C (ASCORBIC ACID) 100 MG tablet     No current facility-administered medications for this visit.      Review of Systems  Constitutional, HEENT, cardiovascular, pulmonary, gi and gu systems are negative, except as otherwise noted.      Objective    BP (!) 147/82   Pulse 61   Temp 96.9  F (36.1  C) (Temporal)   SpO2 96%   Physical Exam   General: Alert and oriented, in no acute distress. Present with daughter, thin  Skin: Warm and dry, no abnormalities  noted.  Eyes: Extra-ocular muscles intact, pupils equal and reactive.  ENT: Speech intact, nasal passages open, no hearing impairment noted.  CV: No cyanosis or pallor, warm and well perfused.  Respiratory: No respiratory distress, no accessory muscle use.    Results for orders placed or performed in visit on 01/12/24   UA Macroscopic with reflex to Microscopic and Culture     Status: Abnormal    Specimen: Urine, Midstream   Result Value Ref Range    Color Urine Yellow Colorless, Straw, Light Yellow, Yellow    Appearance Urine Clear Clear    Glucose Urine Negative Negative mg/dL    Bilirubin Urine Negative Negative    Ketones Urine Negative Negative mg/dL    Specific Gravity Urine 1.025 1.003 - 1.035    Blood Urine Trace (A) Negative    pH Urine 5.5 5.0 - 7.0    Protein Albumin Urine Negative Negative mg/dL    Urobilinogen Urine 0.2 0.2, 1.0 E.U./dL    Nitrite Urine Negative Negative    Leukocyte Esterase Urine Negative Negative   UA Microscopic with Reflex to Culture     Status: Abnormal   Result Value Ref Range    Bacteria Urine None Seen None Seen /HPF    RBC Urine 0-2 0-2 /HPF /HPF    WBC Urine None Seen 0-5 /HPF /HPF    Squamous Epithelials Urine Few (A) None Seen /LPF    Narrative    Urine Culture not indicated           The use of Dragon[employer] dictation services may have been used to construct the content in this note; any grammatical or spelling errors are non-intentional. Please contact the author of this note directly if you are in need of any clarification.

## 2024-01-13 NOTE — PATIENT INSTRUCTIONS
Your UA has no signs of infection. There was some blood in your urine so I recommend that you see a urologist.     If you develop a fever, lower back pain, or abnormal discharge from your penis, please present to the Emergency Department

## 2024-02-09 ENCOUNTER — TELEPHONE (OUTPATIENT)
Dept: UROLOGY | Facility: CLINIC | Age: 79
End: 2024-02-09
Payer: MEDICARE

## 2024-02-09 NOTE — TELEPHONE ENCOUNTER
MEDICAL RECORDS REQUEST   [facility]  Urology Clinic  [address] [place] [address]  PHONE: [phone]  Fax: [phone]        FUTURE VISIT INFORMATION                                                   Carlo Pacheco, : 1945 scheduled for future visit at [facility]    APPOINTMENT INFORMATION:  Date: 2024  Provider:  Jayson Mckeon PA-C  Reason for Visit/Diagnosis: Gross hematuria    REFERRAL INFORMATION:  Referring provider:  Tracie Cornejo      RECORDS REQUESTED FOR VISIT                                                     NOTES  STATUS/DETAILS   OFFICE NOTE from referring provider  yes, 2024 -- Tracie Cornejo @ [facility]    MEDICATION LIST  yes   LABS     URINALYSIS (UA)  yes     PRE-VISIT CHECKLIST      Joint diagnostic appointment coordinated correctly          (ensure right order & amount of time) Yes   RECORD COLLECTION COMPLETE Yes

## 2024-02-09 NOTE — TELEPHONE ENCOUNTER
[facility] Call Center    Phone Message    May a detailed message be left on voicemail: yes     Reason for Call: Other: Patient called stating that he was supposed to see either Christine Doran, or Dr. Rosario for this diagnosis. Writer explained all the providers that see for his diagnosis but pt still said to message the care team and is expecting  text message or a call. Please call pt back to further clarification. Thank you.    Action Taken: [facility]    Travel Screening: Not Applicable

## 2024-02-21 NOTE — PROGRESS NOTES
Consult conducted via real-time audio/video technology by Jayson Mckeon PA-C to the patient in their home.     REASON FOR VISIT  Gross hematuria    HISTORY OF PRESENT ILLNESS  Mr. Pacheco is a 78 year old male who I am speaking with today in regards to his recent finding of gross hematuria.  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  I personally reviewed the urgent care note from 1/12/2024 in preparation for today's visit.    It appears that Carlo presented to the urgent care on 1/12/2024 with a 1 month history of increased urinary urgency.  Urine testing was negative for infection and there was evidence of gross hematuria without microscopic hematuria.  Given his history of tobacco use as well as his gross hematuria, was recommended to see urology for further discussion and evaluation.    Today:  Clarifies that there was no gross hematuria   Urinary urgency has been present for a long time (since prostatectomy 10 days ago), but worse in the last two months  Urinary frequency is also an issue every 1 - 1.5 hours  No urge incontinence   Was given Bactrim for one week at the end of December for a supposed UTI   Fluids:  Coffee: 2 cups - 16 oz  Water: 2 cups - 16 oz   Protein drinks: one bottle   Culture proven UTI in 2021, none since then   6 months struggle with worsening constipation    SOCIAL HISTORY  Former smoker, quit 10 years ago     FAMILY HISTORY   Denies any known family history of urologic malignancy     PHYSICAL EXAMINATION  Deferred given virtual visit.    LABS   Latest Reference Range & Units 01/12/24 18:22   Color Urine Colorless, Straw, Light Yellow, Yellow  Yellow   Appearance Urine Clear  Clear   Glucose Urine Negative mg/dL Negative   Bilirubin Urine  "Negative  Negative   Ketones Urine Negative mg/dL Negative   Specific Gravity Urine 1.003 - 1.035  1.025   pH Urine 5.0 - 7.0  5.5   Protein Albumin Urine Negative mg/dL Negative   Urobilinogen Urine 0.2, 1.0 E.U./dL 0.2   Nitrite Urine Negative  Negative   Blood Urine Negative  Trace !   Leukocyte Esterase Urine Negative  Negative   WBC Urine 0-5 /HPF /HPF None Seen   RBC Urine 0-2 /HPF /HPF 0-2   Bacteria Urine None Seen /HPF None Seen   Squamous Epithelial /LPF Urine None Seen /LPF Few !   !: Data is abnormal    IMAGING  No recent upper tract imaging    IMPRESSION  Urinary frequency  Urinary urgency   Constipation     It was my pleasure to speak with Mr. Pacheco virtually today in regards to his slightly increased urinary frequency and urgency in the last month.  We first reviewed the original reason it sounds like he was sent here which was for blood in the urine.  I am very glad to hear that he has never seen visible blood in the urine, and happy to let him know that the \"trace\" blood on urinalysis does not meet the criteria for microscopic hematuria, meaning that there are no concerns at this time from a blood in the urine perspective and we do not need to have him move forward with a blood in the urine workup.    With this in mind, we spent some time reviewing his bothersome urinary symptoms of frequency and urgency and that these are mainly due to issues with a bladder that is being angry and overactive.  I think part of the reason he could be having issues with urinary frequency and urgency could be a lack of fluids during the day as he is only drinking maybe 40 ounces total, so one of my first recommendations was to increase his water intake during the day by at least 16 more ounces.  We also discussed the fact that his worsening constipation could explain why he is having some worsening urinary symptoms, and that more aggressive control of his constipation could lead to improvement in his urinary " symptoms.    We did discuss that this could be the only interventions we pursue, but this is getting in the way of the way he would like to live his life and so he is wondering if there is any other things we can do at the moment.  I discussed why Kegel exercises would not be particularly helpful for this, but that I do have medications that can be very helpful.  However, given the fact that we have seen a culture proven urinary tract infection as far back as 2021, I feel it would be important for me to check his postvoid residual before offering him an anticholinergic which could increase his postvoid residual.  I will get him scheduled first available on the nursing calendar to get this done and then potentially offer him an anticholinergic.    We discussed that the main side effect of his anticholinergic medications that are meant to help calm the bladder down are dry mouth, dry eyes, and constipation.  This is another main reason I would like him to more aggressively control his constipation prior to starting 1 of these medications.    Mr. Pacheco expressed understanding and agreement to the above discussion and plan and all of his questions were answered to his satisfaction.      PLAN  First available nursing visit for uroflow and PVR  Increase fluids by at least 16 ounces of water per day and more aggressively control constipation  Future anticholinergic trial assuming reassuring postvoid residual    SIGNED    Jayson Mckeon PA-C      I spent a total of 30 minutes spent on the date of the encounter doing chart review, history and exam, documentation, and further activities as noted above.

## 2024-02-22 ENCOUNTER — PRE VISIT (OUTPATIENT)
Dept: UROLOGY | Facility: CLINIC | Age: 79
End: 2024-02-22
Payer: MEDICARE

## 2024-02-22 NOTE — TELEPHONE ENCOUNTER
Reason for visit: Consult on Gross hematuria      Relevant information: UC Referral by Tracie Cornejo DO, on 01/12/24     Records/imaging/labs/orders: 01/12/24 UA Microscopic with Reflex to Culture, 01/12/24 UA Macroscopic with reflex to Microscopic and Culture    Pt called: No need for a call    At Rooming: Standard    Dewayne Cartwright MA  2/22/2024  1:13 PM

## 2024-02-23 ENCOUNTER — TELEPHONE (OUTPATIENT)
Dept: UROLOGY | Facility: CLINIC | Age: 79
End: 2024-02-23

## 2024-02-23 ENCOUNTER — PRE VISIT (OUTPATIENT)
Dept: UROLOGY | Facility: CLINIC | Age: 79
End: 2024-02-23

## 2024-02-23 ENCOUNTER — VIRTUAL VISIT (OUTPATIENT)
Dept: UROLOGY | Facility: CLINIC | Age: 79
End: 2024-02-23
Attending: STUDENT IN AN ORGANIZED HEALTH CARE EDUCATION/TRAINING PROGRAM
Payer: MEDICARE

## 2024-02-23 DIAGNOSIS — R35.0 URINARY FREQUENCY: ICD-10-CM

## 2024-02-23 DIAGNOSIS — R39.15 URINARY URGENCY: Primary | ICD-10-CM

## 2024-02-23 PROCEDURE — 99204 OFFICE O/P NEW MOD 45 MIN: CPT | Mod: 95 | Performed by: STUDENT IN AN ORGANIZED HEALTH CARE EDUCATION/TRAINING PROGRAM

## 2024-02-23 NOTE — PROGRESS NOTES
Virtual Visit Details    Type of service:  Video Visit   Video Start Time: 7:53 AM  Video End Time: 8:09 AM    Originating Location (pt. Location): Home    Distant Location (provider location):  Off-site  Platform used for Video Visit: Ciro

## 2024-02-23 NOTE — LETTER
2/23/2024       RE: Carlo Pacheco  [address] [place] [address]     Dear Colleague,    Thank you for referring your patient, Carlo Pacheco, to the [facility]OLOG[facility]. Please see a copy of my visit note below.    Consult conducted via real-time audio/video technology by Jayson Mckeon PA-C to the patient in their home.     REASON FOR VISIT  Gross hematuria    HISTORY OF PRESENT ILLNESS  Mr. Pacheco is a 78 year old male who I am speaking with today in regards to his recent finding of gross hematuria.  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  I personally reviewed the urgent care note from 1/12/2024 in preparation for today's visit.    It appears that Carlo presented to the urgent care on 1/12/2024 with a 1 month history of increased urinary urgency.  Urine testing was negative for infection and there was evidence of gross hematuria without microscopic hematuria.  Given his history of tobacco use as well as his gross hematuria, was recommended to see urology for further discussion and evaluation.    Today:  Clarifies that there was no gross hematuria   Urinary urgency has been present for a long time (since prostatectomy 10 days ago), but worse in the last two months  Urinary frequency is also an issue every 1 - 1.5 hours  No urge incontinence   Was given Bactrim for one week at the end of December for a supposed UTI   Fluids:  Coffee: 2 cups - 16 oz  Water: 2 cups - 16 oz   Protein drinks: one bottle   Culture proven UTI in 2021, none since then   6 months struggle with worsening constipation    SOCIAL HISTORY  Former smoker, quit 10 years ago     FAMILY HISTORY   Denies any  "known family history of urologic malignancy     PHYSICAL EXAMINATION  Deferred given virtual visit.    LABS   Latest Reference Range & Units 01/12/24 18:22   Color Urine Colorless, Straw, Light Yellow, Yellow  Yellow   Appearance Urine Clear  Clear   Glucose Urine Negative mg/dL Negative   Bilirubin Urine Negative  Negative   Ketones Urine Negative mg/dL Negative   Specific Gravity Urine 1.003 - 1.035  1.025   pH Urine 5.0 - 7.0  5.5   Protein Albumin Urine Negative mg/dL Negative   Urobilinogen Urine 0.2, 1.0 E.U./dL 0.2   Nitrite Urine Negative  Negative   Blood Urine Negative  Trace !   Leukocyte Esterase Urine Negative  Negative   WBC Urine 0-5 /HPF /HPF None Seen   RBC Urine 0-2 /HPF /HPF 0-2   Bacteria Urine None Seen /HPF None Seen   Squamous Epithelial /LPF Urine None Seen /LPF Few !   !: Data is abnormal    IMAGING  No recent upper tract imaging    IMPRESSION  Urinary frequency  Urinary urgency   Constipation     It was my pleasure to speak with Mr. Pacheco virtually today in regards to his slightly increased urinary frequency and urgency in the last month.  We first reviewed the original reason it sounds like he was sent here which was for blood in the urine.  I am very glad to hear that he has never seen visible blood in the urine, and happy to let him know that the \"trace\" blood on urinalysis does not meet the criteria for microscopic hematuria, meaning that there are no concerns at this time from a blood in the urine perspective and we do not need to have him move forward with a blood in the urine workup.    With this in mind, we spent some time reviewing his bothersome urinary symptoms of frequency and urgency and that these are mainly due to issues with a bladder that is being angry and overactive.  I think part of the reason he could be having issues with urinary frequency and urgency could be a lack of fluids during the day as he is only drinking maybe 40 ounces total, so one of my first " recommendations was to increase his water intake during the day by at least 16 more ounces.  We also discussed the fact that his worsening constipation could explain why he is having some worsening urinary symptoms, and that more aggressive control of his constipation could lead to improvement in his urinary symptoms.    We did discuss that this could be the only interventions we pursue, but this is getting in the way of the way he would like to live his life and so he is wondering if there is any other things we can do at the moment.  I discussed why Kegel exercises would not be particularly helpful for this, but that I do have medications that can be very helpful.  However, given the fact that we have seen a culture proven urinary tract infection as far back as 2021, I feel it would be important for me to check his postvoid residual before offering him an anticholinergic which could increase his postvoid residual.  I will get him scheduled first available on the nursing calendar to get this done and then potentially offer him an anticholinergic.    We discussed that the main side effect of his anticholinergic medications that are meant to help calm the bladder down are dry mouth, dry eyes, and constipation.  This is another main reason I would like him to more aggressively control his constipation prior to starting 1 of these medications.    Mr. Pacheco expressed understanding and agreement to the above discussion and plan and all of his questions were answered to his satisfaction.      PLAN  First available nursing visit for uroflow and PVR  Increase fluids by at least 16 ounces of water per day and more aggressively control constipation  Future anticholinergic trial assuming reassuring postvoid residual    SIGNED    Jayson Mckeon PA-C      I spent a total of 30 minutes spent on the date of the encounter doing chart review, history and exam, documentation, and further activities as noted above.    Virtual  Visit Details    Type of service:  Video Visit   Video Start Time: 7:53 AM  Video End Time: 8:09 AM    Originating Location (pt. Location): Home    Distant Location (provider location):  Off-site  Platform used for Video Visit: Ciro

## 2024-02-23 NOTE — NURSING NOTE
Is the patient currently in the state of [place]? YES    Visit mode:VIDEO    If the visit is dropped, the patient can be reconnected by: VIDEO VISIT: Text to cell phone:   Telephone Information:   Mobile [phone]       Will anyone else be joining the visit? NO  (If patient encounters technical issues they should call [phone]    How would you like to obtain your AVS? MyChart    Are changes needed to the allergy or medication list? No, Patient denies any changes since echeck-in regarding medication and allergies and states all information entered during echeck-in remains accurate.      Reason for visit: Consult    Kanwal THOMAS

## 2024-03-01 ENCOUNTER — OFFICE VISIT (OUTPATIENT)
Dept: UROLOGY | Facility: CLINIC | Age: 79
End: 2024-03-01
Payer: MEDICARE

## 2024-03-01 ENCOUNTER — MYC MEDICAL ADVICE (OUTPATIENT)
Dept: UROLOGY | Facility: CLINIC | Age: 79
End: 2024-03-01

## 2024-03-01 DIAGNOSIS — R35.0 URINARY FREQUENCY: Primary | ICD-10-CM

## 2024-03-01 DIAGNOSIS — R39.15 URINARY URGENCY: ICD-10-CM

## 2024-03-01 PROCEDURE — 99207 PR MEASURE POST-VOID RESIDUAL URINE/BLADDER CAPACITY, US NON-IMAGING: CPT

## 2024-03-01 NOTE — PROGRESS NOTES
Carlo Pacheco comes into clinic today at the request of Jayson Mckeon PA-C with the diagnosis of urinary urgency, urinary frequency for a PVR/Flow (uroflow).          Ordering provider notified via inbasket.    This service provided today was under the supervising provider of the day Jayson Aceves MD, who was available if needed.    Results were inconclusive due to malfunction of Uro-flow machine.  PVR was 0    Erna Gtz LPN  3/1/2024  10:42 AM

## 2024-03-05 RX ORDER — OXYBUTYNIN CHLORIDE 5 MG/1
5 TABLET, EXTENDED RELEASE ORAL DAILY
Qty: 30 TABLET | Refills: 11 | Status: SHIPPED | OUTPATIENT
Start: 2024-03-05 | End: 2024-06-14

## 2024-04-24 ENCOUNTER — ANCILLARY PROCEDURE (OUTPATIENT)
Dept: CT IMAGING | Facility: CLINIC | Age: 79
End: 2024-04-24
Attending: ORTHOPAEDIC SURGERY
Payer: MEDICARE

## 2024-04-24 ENCOUNTER — ANCILLARY PROCEDURE (OUTPATIENT)
Dept: MRI IMAGING | Facility: CLINIC | Age: 79
End: 2024-04-24
Attending: ORTHOPAEDIC SURGERY
Payer: MEDICARE

## 2024-04-24 DIAGNOSIS — C49.9 SARCOMA (H): ICD-10-CM

## 2024-04-24 PROCEDURE — 73720 MRI LWR EXTREMITY W/O&W/DYE: CPT | Mod: RT | Performed by: RADIOLOGY

## 2024-04-24 PROCEDURE — A9585 GADOBUTROL INJECTION: HCPCS | Performed by: RADIOLOGY

## 2024-04-24 PROCEDURE — 71250 CT THORAX DX C-: CPT | Performed by: RADIOLOGY

## 2024-04-24 RX ORDER — GADOBUTROL 604.72 MG/ML
7.5 INJECTION INTRAVENOUS ONCE
Status: COMPLETED | OUTPATIENT
Start: 2024-04-24 | End: 2024-04-24

## 2024-04-24 RX ADMIN — GADOBUTROL 7.5 ML: 604.72 INJECTION INTRAVENOUS at 11:32

## 2024-04-24 NOTE — DISCHARGE INSTRUCTIONS
MRI Contrast Discharge Instructions    The IV contrast you received today will pass out of your body in your  urine. This will happen in the next 24 hours. You will not feel this process.  Your urine will not change color.    Drink at least 4 extra glasses of water or juice today (unless your doctor  has restricted your fluids). This reduces the stress on your kidneys.  You may take your regular medicines.    If you are on dialysis: It is best to have dialysis today.    If you have a reaction: Most reactions happen right away. If you have  any new symptoms after leaving the hospital (such as hives or swelling),  call your hospital at the correct number below. Or call your family doctor.  If you have breathing distress or wheezing, call 911.    Special instructions: ***    I have read and understand the above information.    Signature:______________________________________ Date:___________    Staff:__________________________________________ Date:___________     Time:__________    [place] [facility] Departments:    ___[place]: [phone]  ___[facility]: [phone]  ___[place]: [phone] ___[facility]: [phone]  ___[facility]: [phone]  ___[facility]: [phone]  ___[phone]  ___[facility]: [phone]  ___[phone]

## 2024-06-14 ENCOUNTER — VIRTUAL VISIT (OUTPATIENT)
Dept: UROLOGY | Facility: CLINIC | Age: 79
End: 2024-06-14
Payer: MEDICARE

## 2024-06-14 DIAGNOSIS — R35.0 URINARY FREQUENCY: ICD-10-CM

## 2024-06-14 PROCEDURE — 99214 OFFICE O/P EST MOD 30 MIN: CPT | Mod: 95 | Performed by: STUDENT IN AN ORGANIZED HEALTH CARE EDUCATION/TRAINING PROGRAM

## 2024-06-14 RX ORDER — OXYBUTYNIN CHLORIDE 5 MG/1
10 TABLET, EXTENDED RELEASE ORAL DAILY
Qty: 60 TABLET | Refills: 11 | Status: SHIPPED | OUTPATIENT
Start: 2024-06-14

## 2024-06-14 ASSESSMENT — PAIN SCALES - GENERAL: PAINLEVEL: NO PAIN (0)

## 2024-06-14 NOTE — NURSING NOTE
Is the patient currently in the state of [place]? YES    Visit mode:VIDEO    If the visit is dropped, the patient can be reconnected by: VIDEO VISIT: Send to e-mail at: [email or web address]    Will anyone else be joining the visit? NO  (If patient encounters technical issues they should call [phone]    How would you like to obtain your AVS? MyChart    Are changes needed to the allergy or medication list? No    Are refills needed on medications prescribed by this physician? NO    Reason for visit: Follow Up    Fabiana THOMAS

## 2024-06-14 NOTE — PROGRESS NOTES
Virtual Visit Details    Type of service:  Video Visit   Video Start Time: 1:10 PM  Video End Time:1:19 PM    Originating Location (pt. Location): Home    Distant Location (provider location):  Off-site  Platform used for Video Visit: Ciro

## 2024-06-14 NOTE — PROGRESS NOTES
Subjective     REASON FOR VISIT  Overactive bladder follow-up     HISTORY OF PRESENT ILLNESS  Mr. Pacheco is a pleasant 78 year old male who I am speaking with today in regards to his desire to review third line therapies for overactive bladder.  I most recently saw this patient virtually on 2/23/2024, at which time the plan was to trial an anticholinergic medication after being sure his postvoid residual was reassuring.  PVR was completed on 3/1/2024 with a value of 0 mL, so oxybutynin was sent to patient's pharmacy.    Carlo reached out following this test being sure it was safe to take this medication while on things like allergy medications.  I confirmed that this is still safe to do so he should stop if he was having bothersome symptoms.  Finally, he contacted our department    Today:  Oxybutynin has been working a bit, wondering if he can take more    Objective     PHYSICAL EXAMINATION  General: Alert, oriented, no acute distress    Assessment & Plan    Overactive bladder     It was my pleasure to meet with Carlo in follow-up for his overactive bladder currently utilizing oxybutynin.  After reviewing his clinical history, we spent the majority of our visit discussing third line therapies for overactive bladder including PTNS, bladder Botox, or sacral neuromodulation.  After reviewing these options, Carlo states that he feels the oxybutynin is actually working relatively well and instead would prefer to stick with this and increase the dose to see if he gets better efficacy.  This is reasonable, and I will plan to have him reach out to me over [employer] with an update on his efficacy to determine follow-up.    Mr. Pacheco expressed understanding and agreement to the above discussion and plan and all of his questions were answered to his satisfaction.     PLAN  Increase dose of oxybutynin to 10 mg daily with [employer] message to update me on efficacy     SIGNED:    Jayson Mckeon PA-C

## 2024-06-14 NOTE — LETTER
6/14/2024       RE: Carlo Pacheco  [address] [place] [address]     Dear Colleague,    Thank you for referring your patient, Carlo Pacheco, to the [facility]. Please see a copy of my visit note below.    Subjective    REASON FOR VISIT  Overactive bladder follow-up     HISTORY OF PRESENT ILLNESS  Mr. Pacheco is a pleasant 78 year old male who I am speaking with today in regards to his desire to review third line therapies for overactive bladder.  I most recently saw this patient virtually on 2/23/2024, at which time the plan was to trial an anticholinergic medication after being sure his postvoid residual was reassuring.  PVR was completed on 3/1/2024 with a value of 0 mL, so oxybutynin was sent to patient's pharmacy.    Carlo reached out following this test being sure it was safe to take this medication while on things like allergy medications.  I confirmed that this is still safe to do so he should stop if he was having bothersome symptoms.  Finally, he contacted our department    Today:  Oxybutynin has been working a bit, wondering if he can take more    Objective    PHYSICAL EXAMINATION  General: Alert, oriented, no acute distress    Assessment & Plan   Overactive bladder     It was my pleasure to meet with Carlo in follow-up for his overactive bladder currently utilizing oxybutynin.  After reviewing his clinical history, we spent the majority of our visit discussing third line therapies for overactive bladder including PTNS, bladder Botox, or sacral neuromodulation.  After reviewing these options, Carlo states that he feels the oxybutynin is actually working relatively well and instead would prefer to stick with this and increase the dose to see if he gets better efficacy.  This is reasonable, and I will plan to have him reach out to me over [facility] with an update on his efficacy to determine  follow-up.    Mr. Pacheco expressed understanding and agreement to the above discussion and plan and all of his questions were answered to his satisfaction.     PLAN  Increase dose of oxybutynin to 10 mg daily with [employer] message to update me on efficacy     SIGNED:    Jayson Mckeon PA-C    Virtual Visit Details    Type of service:  Video Visit   Video Start Time: 1:10 PM  Video End Time:1:19 PM    Originating Location (pt. Location): Home    Distant Location (provider location):  Off-site  Platform used for Video Visit: Ciro

## 2024-10-13 ENCOUNTER — OFFICE VISIT (OUTPATIENT)
Dept: URGENT CARE | Facility: URGENT CARE | Age: 79
End: 2024-10-13
Payer: MEDICARE

## 2024-10-13 ENCOUNTER — HOSPITAL ENCOUNTER (EMERGENCY)
Facility: CLINIC | Age: 79
Discharge: HOME | End: 2024-10-13
Attending: EMERGENCY MEDICINE | Admitting: EMERGENCY MEDICINE
Payer: MEDICARE

## 2024-10-13 ENCOUNTER — APPOINTMENT (OUTPATIENT)
Dept: ULTRASOUND IMAGING | Facility: CLINIC | Age: 79
End: 2024-10-13
Attending: EMERGENCY MEDICINE
Payer: MEDICARE

## 2024-10-13 VITALS
TEMPERATURE: 97.6 F | HEART RATE: 56 BPM | BODY MASS INDEX: 20.55 KG/M2 | OXYGEN SATURATION: 93 % | WEIGHT: 146.8 LBS | DIASTOLIC BLOOD PRESSURE: 79 MMHG | HEIGHT: 71 IN | SYSTOLIC BLOOD PRESSURE: 154 MMHG | RESPIRATION RATE: 16 BRPM

## 2024-10-13 VITALS
BODY MASS INDEX: 20.72 KG/M2 | HEART RATE: 51 BPM | RESPIRATION RATE: 18 BRPM | OXYGEN SATURATION: 97 % | HEIGHT: 71 IN | SYSTOLIC BLOOD PRESSURE: 174 MMHG | DIASTOLIC BLOOD PRESSURE: 87 MMHG | WEIGHT: 148 LBS | TEMPERATURE: 97.4 F

## 2024-10-13 DIAGNOSIS — R60.0 LOWER EXTREMITY EDEMA: ICD-10-CM

## 2024-10-13 DIAGNOSIS — R60.0 LEG EDEMA, LEFT: Primary | ICD-10-CM

## 2024-10-13 PROCEDURE — 99215 OFFICE O/P EST HI 40 MIN: CPT | Performed by: STUDENT IN AN ORGANIZED HEALTH CARE EDUCATION/TRAINING PROGRAM

## 2024-10-13 PROCEDURE — 93971 EXTREMITY STUDY: CPT | Mod: 26 | Performed by: RADIOLOGY

## 2024-10-13 PROCEDURE — 99283 EMERGENCY DEPT VISIT LOW MDM: CPT | Performed by: EMERGENCY MEDICINE

## 2024-10-13 PROCEDURE — 99284 EMERGENCY DEPT VISIT MOD MDM: CPT | Mod: 25 | Performed by: EMERGENCY MEDICINE

## 2024-10-13 PROCEDURE — 93971 EXTREMITY STUDY: CPT | Mod: LT

## 2024-10-13 ASSESSMENT — ACTIVITIES OF DAILY LIVING (ADL): ADLS_ACUITY_SCORE: 35

## 2024-10-13 ASSESSMENT — COLUMBIA-SUICIDE SEVERITY RATING SCALE - C-SSRS
6. HAVE YOU EVER DONE ANYTHING, STARTED TO DO ANYTHING, OR PREPARED TO DO ANYTHING TO END YOUR LIFE?: NO
1. IN THE PAST MONTH, HAVE YOU WISHED YOU WERE DEAD OR WISHED YOU COULD GO TO SLEEP AND NOT WAKE UP?: NO
2. HAVE YOU ACTUALLY HAD ANY THOUGHTS OF KILLING YOURSELF IN THE PAST MONTH?: NO

## 2024-10-13 NOTE — PATIENT INSTRUCTIONS
Left leg swelling:  - Concerning for blood clot  - Will need to go to the Emergency Department to get an ultrasound

## 2024-10-13 NOTE — ED PROVIDER NOTES
ED Provider Note  [facility]      History     Chief Complaint   Patient presents with    Leg Pain     The history is provided by medical records and the patient.     Carlo Pacheco is a 78 year old male with history of CAD, HTN, COPD, prostate cancer, and prior provoked DVT who was sent to the ED from  for further evaluation of leg swelling. He was seen at [facility] for 2 weeks of progressive, painless LLE swelling where exam concerning for DVT with 4+ pitting edema of the LLE from ankle to [facility] up the calf so he was sent here for US. He does have history of DVT in the RLE provoked after surgery a few years ago. He took Lovenox for this and is not currently anticoagulated.  Patient denies chest pain, shortness of breath, fevers, chills, leg trauma, or other acute complaints at this time.     Past Medical History  Past Medical History:   Diagnosis Date    Anxiety     COPD (chronic obstructive pulmonary disease) (H)     Emphysema lung (H)     GERD (gastroesophageal reflux disease)     Hypertension     Lipoma     Prostate cancer (H)      Past Surgical History:   Procedure Laterality Date    HERNIA REPAIR      PROSTATECTOMY SUPRAPUBIC       acetaminophen (TYLENOL) 500 MG tablet  albuterol (PROAIR HFA/PROVENTIL HFA/VENTOLIN HFA) 108 (90 Base) MCG/ACT inhaler  bisacodyl (DULCOLAX) 10 MG suppository  fluticasone-salmeterol (ADVAIR) 100-50 MCG/DOSE inhaler  gabapentin (NEURONTIN) 100 MG capsule  latanoprost (XALATAN) 0.005 % ophthalmic solution  NIFEdipine ER OSMOTIC (PROCARDIA XL) 30 MG 24 hr tablet  oxyBUTYnin ER (DITROPAN XL) 5 MG 24 hr tablet  valACYclovir (VALTREX) 1000 mg tablet  vitamin B-12 (CYANOCOBALAMIN) 250 MCG tablet  vitamin C (ASCORBIC ACID) 100 MG tablet      Allergies   Allergen Reactions    Azithromycin Hives, Difficulty breathing, Other (See Comments), Shortness Of Breath and Unknown    Ibuprofen GI Disturbance and Unknown     Other reaction(s):  "Gastrointestinal  GI upset and spits up foam 2 hours after taking it for hours afterwards  Esophageal discomfort with oral NSAIDS  GI upset and spits up foam 2 hours after taking it for hours afterwards  Esophageal discomfort with oral NSAIDS      Omeprazole GI Disturbance, Nausea, Other (See Comments) and Unknown     Antacids  Antacids-severe gastrointestinal symptoms, pt dosebnot want them used  Antacids  Antacids-severe gastrointestinal symptoms, pt dosebnot want them used      Pantoprazole GI Disturbance and Unknown     Other reaction(s): Other (see comments)  All antacids!!!!! Severe GI intolerance, pt does not want them used  All antacids!!!!! Severe GI intolerance, pt does not want them used      Penicillins Unknown and Other (See Comments)     Pt was told as a child the penicillin didn't work for him  Pt was told as a child the penicillin didn't work for him       Family History  No family history on file.  Social History   Social History     Tobacco Use    Smoking status: Former     Types: Cigarettes    Smokeless tobacco: Never    Tobacco comments:     CBD Gummies and Drops      A complete review of systems was performed with pertinent positives and negatives noted in the HPI, and all other systems negative.    Physical Exam   BP: (!) 174/87  Pulse: 51  Temp: 97.4  F (36.3  C)  Resp: 18  Height: 180.3 cm (5' 11\")  Weight: 67.1 kg (148 lb)  SpO2: 97 %  Physical Exam  Vitals and nursing note reviewed.   Constitutional:       General: He is not in acute distress.     Appearance: Normal appearance. He is not toxic-appearing.   HENT:      Head: Atraumatic.   Eyes:      General: No scleral icterus.     Conjunctiva/sclera: Conjunctivae normal.   Cardiovascular:      Rate and Rhythm: Regular rhythm. Bradycardia present.   Pulmonary:      Effort: Pulmonary effort is normal. No respiratory distress.   Abdominal:      Palpations: Abdomen is soft.      Tenderness: There is no abdominal tenderness.   Musculoskeletal:    "      General: No deformity.      Cervical back: Neck supple.      Right lower leg: No edema.      Left lower leg: Edema present.   Skin:     General: Skin is warm and dry.      Findings: No erythema or rash.   Neurological:      General: No focal deficit present.      Mental Status: He is alert and oriented to person, place, and time.   Psychiatric:         Mood and Affect: Mood normal.         Behavior: Behavior normal.           ED Course, Procedures, & Data      Procedures                Results for orders placed or performed during the hospital encounter of 10/13/24   US Lower Extremity Venous Duplex Left     Status: None    Narrative    EXAMINATION: US LOWER EXTREMITY VENOUS DUPLEX LEFT  10/13/2024 12:00  PM      CLINICAL HISTORY: Swelling, concern for DVT    COMPARISON: None        PROCEDURE COMMENTS: Ultrasound was performed of the deep venous system  of the left lower extremity using grayscale, color, and spectral  Doppler.    FINDINGS:  The common femoral, greater saphenous origin, femoral, popliteal, and  deep calf veins are visualized and are patent. Venous waveforms are  normal. There is normal response to compression. Subcutaneous edema  towards the ankle.      Impression    IMPRESSION:.  No deep vein thrombosis in the left lower extremity.    I have personally reviewed the examination and initial interpretation  and I agree with the findings.    SLAVA OGDEN MD         SYSTEM ID:  [id number]     Medications - No data to display  Labs Ordered and Resulted from Time of ED Arrival to Time of ED Departure - No data to display  US Lower Extremity Venous Duplex Left   Final Result   IMPRESSION:.   No deep vein thrombosis in the left lower extremity.      I have personally reviewed the examination and initial interpretation   and I agree with the findings.      SLAVA OGDEN MD            SYSTEM ID:  [id number]             Critical care was not performed.     Medical Decision Making  The patient's  presentation was of moderate complexity (an undiagnosed new problem with uncertain prognosis).    The patient's evaluation involved:  review of external note(s) from 1 sources (see separate area of note for details)  ordering and/or review of 1 test(s) in this encounter (see separate area of note for details)    The patient's management necessitated only low risk treatment.    Assessment & Plan    Carlo Pacheco is a 78 year old male with history of CAD, HTN, COPD, prostate cancer, and prior provoked DVT who was sent to the ED from  for further evaluation of leg swelling.  Patient is nontoxic-appearing on exam, mildly bradycardic, hypertensive to 174/87, other vital signs are within normal limits.  Patient does have pitting edema of the left lower extremity and no significant swelling of the right lower extremity.  Ultrasound was obtained to look for DVT which came back negative.  Unclear etiology of asymmetric leg swelling at this time, however patient does not have signs of infection, does not have DVT based on ultrasound, which is reassuring.  Patient appropriate for further outpatient workup and management, discharged with outpatient follow-up and return precautions.    I have reviewed the nursing notes. I have reviewed the findings, diagnosis, plan and need for follow up with the patient.    New Prescriptions    No medications on file       Final diagnoses:   Lower extremity edema       Remi Traore MD  [facility] EMERGENCY DEPARTMENT  10/13/2024        Remi Traore MD  10/13/24 7961

## 2024-10-13 NOTE — PROGRESS NOTES
"Assessment & Plan     Leg edema, left  2 weeks of progressive swelling the left lower extremity.  No edema in the right leg. 4+ pitting edema above the ankle to nursing home up calf on left side.  Nonpainful and no erythema. History of DVT in the right leg, that was provoked by surgery.  Not currently on anticoagulation.  No respiratory symptoms or chest pain to suggest pulmonary embolism.  However her symptoms concerning for DVT.  - Recommended to go to the emergency department for DVT ultrasound, patient planning on heading to [facility]             No follow-ups on file.    Andrew Palm MD  [facility] [place]    Karthik Matos is a 78 year old male who presents to clinic today for the following health issues:    2 weeks of progressive swelling in the left lower extremity.  Has not been painful.  No swelling in the right side.  Does have a history of a clot that was provoked after surgery a few years ago.  Took Lovenox for this.  Is not currently on anticoagulation.  Has not had any respiratory symptoms or chest pain.  His daughter is a nurse and looked at it and told him that he likely needed an ultrasound to rule out a clot.    Chief Complaint   Patient presents with    Urgent Care    Ankle Problem     Pt in clinic to have eval for left ankle swelling.  Pt is concerned about possible blood clot as he has a history.           Review of Systems        Objective    BP (!) 154/79   Pulse 56   Temp 97.6  F (36.4  C) (Temporal)   Resp 16   Ht 1.803 m (5' 11\")   Wt 66.6 kg (146 lb 12.8 oz)   SpO2 93%   BMI 20.47 kg/m    Physical Exam   GENERAL: alert and no distress  RESP: lungs clear to auscultation - no rales, rhonchi or wheezes  CV: regular rate and rhythm, normal S1 S2, no S3 or S4, no murmur, click or rub, no peripheral edema  MS: 4+ pitting edema in the left lower extremity from the ankle to nursing home up the calf, no pain with palpation, pulses intact, right lower leg normal exam " with no edema

## 2024-10-13 NOTE — ED TRIAGE NOTES
"Triage Assessment & Note:    BP (!) 174/87   Pulse 51   Temp 97.4  F (36.3  C) (Oral)   Resp 18   Ht 1.803 m (5' 11\")   Wt 67.1 kg (148 lb)   SpO2 97%   BMI 20.64 kg/m      Patient presents with: PT c/o left lower leg swelling. PT has a hx of blood clots. PT denies SOB.     Home Treatments/Remedies: None    Febrile / Afebrile? Afebrile     Duration of C/o:  2 weeks    Viktor Moreira RN  October 13, 2024       Triage Assessment (Adult)       Row Name 10/13/24 1115          Triage Assessment    Airway WDL WDL        Respiratory WDL    Respiratory WDL WDL        Cardiac WDL    Cardiac WDL WDL                     "

## 2024-11-16 ENCOUNTER — HOSPITAL ENCOUNTER (EMERGENCY)
Facility: CLINIC | Age: 79
Discharge: HOME | End: 2024-11-16
Attending: EMERGENCY MEDICINE | Admitting: EMERGENCY MEDICINE
Payer: MEDICARE

## 2024-11-16 ENCOUNTER — APPOINTMENT (OUTPATIENT)
Dept: ULTRASOUND IMAGING | Facility: CLINIC | Age: 79
End: 2024-11-16
Attending: PHYSICIAN ASSISTANT
Payer: MEDICARE

## 2024-11-16 VITALS
HEIGHT: 71 IN | DIASTOLIC BLOOD PRESSURE: 97 MMHG | SYSTOLIC BLOOD PRESSURE: 177 MMHG | HEART RATE: 59 BPM | WEIGHT: 145 LBS | BODY MASS INDEX: 20.3 KG/M2 | TEMPERATURE: 97.7 F | OXYGEN SATURATION: 92 % | RESPIRATION RATE: 16 BRPM

## 2024-11-16 DIAGNOSIS — M79.661 RIGHT CALF PAIN: ICD-10-CM

## 2024-11-16 PROCEDURE — 93971 EXTREMITY STUDY: CPT | Mod: 26 | Performed by: RADIOLOGY

## 2024-11-16 PROCEDURE — 99283 EMERGENCY DEPT VISIT LOW MDM: CPT | Performed by: EMERGENCY MEDICINE

## 2024-11-16 PROCEDURE — 99284 EMERGENCY DEPT VISIT MOD MDM: CPT | Mod: 25 | Performed by: EMERGENCY MEDICINE

## 2024-11-16 PROCEDURE — 93971 EXTREMITY STUDY: CPT | Mod: RT

## 2024-11-16 ASSESSMENT — COLUMBIA-SUICIDE SEVERITY RATING SCALE - C-SSRS
2. HAVE YOU ACTUALLY HAD ANY THOUGHTS OF KILLING YOURSELF IN THE PAST MONTH?: NO
6. HAVE YOU EVER DONE ANYTHING, STARTED TO DO ANYTHING, OR PREPARED TO DO ANYTHING TO END YOUR LIFE?: NO
1. IN THE PAST MONTH, HAVE YOU WISHED YOU WERE DEAD OR WISHED YOU COULD GO TO SLEEP AND NOT WAKE UP?: NO

## 2024-11-16 ASSESSMENT — ACTIVITIES OF DAILY LIVING (ADL)
ADLS_ACUITY_SCORE: 0
ADLS_ACUITY_SCORE: 0

## 2024-11-16 NOTE — ED TRIAGE NOTES
"  Pt arrives ambulatory to the ED with c/o right lower extremity pain and swelling to the calf/ankle area. Pt states \"I'm Worried I might have a blood clot. I've had a blood clot in this leg before\". Pt denies blood thinners. Pt adds, \"It started swelling up on me this afternoon\".        Triage Assessment (Adult)       Row Name 11/16/24 0041          Triage Assessment    Airway WDL WDL        Respiratory WDL    Respiratory WDL WDL        Skin Circulation/Temperature WDL    Skin Circulation/Temperature WDL WDL        Cardiac WDL    Cardiac WDL WDL        Peripheral/Neurovascular WDL    Peripheral Neurovascular WDL WDL        Cognitive/Neuro/Behavioral WDL    Cognitive/Neuro/Behavioral WDL WDL                     "

## 2024-11-16 NOTE — ED PROVIDER NOTES
"ED Provider Note  [facility]      History     Chief Complaint   Patient presents with    Leg Swelling     Pt arrives ambulatory to the ED with c/o right lower extremity pain and swelling to the calf/ankle area. Pt states \"I'm Worried I might have a blood clot. I've had a blood clot in this leg before\". Pt denies blood thinners. Pt adds, \"It started swelling up on me this afternoon\".     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  He is here with his daughter.  He notes earlier today he was at the gym, and shortly after his workout he started noticing new onset pain and swelling to his right calf.  He feels like this was when he had his prior DVT.  Calf pain is without any associated injury denies any numbness or tingling of the right lower extremity.  He denies any problems with the left leg.  No back pain.  He denies any associated fevers cough dyspnea chest pain hemoptysis recent surgeries immobilizations history of hormone use.  He does note over the last 1 week he has also been doing increased calf raises at the gym as well hoping to increase blood flow to his legs.  Per chart review patient underwent ABIs which showed perfusion was above the healing threshold per vascular would not cause lower extremity edema.    Past Medical History  Past Medical History:   Diagnosis Date    Anxiety     COPD (chronic obstructive pulmonary disease) (H)     Emphysema lung (H)     GERD (gastroesophageal reflux disease)     Hypertension     Lipoma     Prostate cancer (H)      Past Surgical History:   Procedure Laterality Date    HERNIA REPAIR      PROSTATECTOMY SUPRAPUBIC       acetaminophen (TYLENOL) 500 MG tablet  albuterol (PROAIR HFA/PROVENTIL HFA/VENTOLIN HFA) 108 (90 Base) " MCG/ACT inhaler  bisacodyl (DULCOLAX) 10 MG suppository  fluticasone-salmeterol (ADVAIR) 100-50 MCG/DOSE inhaler  gabapentin (NEURONTIN) 100 MG capsule  latanoprost (XALATAN) 0.005 % ophthalmic solution  NIFEdipine ER OSMOTIC (PROCARDIA XL) 30 MG 24 hr tablet  oxyBUTYnin ER (DITROPAN XL) 5 MG 24 hr tablet  valACYclovir (VALTREX) 1000 mg tablet  vitamin B-12 (CYANOCOBALAMIN) 250 MCG tablet  vitamin C (ASCORBIC ACID) 100 MG tablet      Allergies   Allergen Reactions    Azithromycin Hives, Difficulty breathing, Other (See Comments), Shortness Of Breath and Unknown    Ibuprofen GI Disturbance and Unknown     Other reaction(s): Gastrointestinal  GI upset and spits up foam 2 hours after taking it for hours afterwards  Esophageal discomfort with oral NSAIDS  GI upset and spits up foam 2 hours after taking it for hours afterwards  Esophageal discomfort with oral NSAIDS      Omeprazole GI Disturbance, Nausea, Other (See Comments) and Unknown     Antacids  Antacids-severe gastrointestinal symptoms, pt dosebnot want them used  Antacids  Antacids-severe gastrointestinal symptoms, pt dosebnot want them used      Pantoprazole GI Disturbance and Unknown     Other reaction(s): Other (see comments)  All antacids!!!!! Severe GI intolerance, pt does not want them used  All antacids!!!!! Severe GI intolerance, pt does not want them used      Penicillins Unknown and Other (See Comments)     Pt was told as a child the penicillin didn't work for him  Pt was told as a child the penicillin didn't work for him       Family History  No family history on file.  Social History   Social History     Tobacco Use    Smoking status: Former     Types: Cigarettes    Smokeless tobacco: Never    Tobacco comments:     CBD Gummies and Drops      A medically appropriate review of systems was performed with pertinent positives and negatives noted in the HPI, and all other systems negative.    Physical Exam   BP: (!) 169/81  Pulse: 60  Temp: 97.6  F (36.4  " C)  Resp: 18  Height: 180.3 cm (5' 11\")  Weight: 65.8 kg (145 lb)  SpO2: 94 %  Physical Exam  GENERAL APPEARANCE: The patient is well developed, well appearing, and in no acute distress.  HEAD:  Normocephalic and atraumatic.   EENT: Voice normal.  NECK: Trachea is midline.No lymphadenopathy or tenderness.  LUNGS: Breath sounds are equal and clear bilaterally. No wheezes, rhonchi, or rales.  HEART: Regular rate and normal rhythm.   ABDOMEN: Soft, flat, and benign. No mass, tenderness, guarding, or rebound.Bowel sounds are present.  EXTREMITIES: Examination of the lower extremity shows bilateral equal lower extremity edema, 2+ pitting.  No associated erythema no open wounds patient has intact sensation to the lower extremities bilaterally.  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  Patient has atrophy of the right thigh per patient similar to his baseline.  Unable to palpate 2+ PT pulses.  Lower extremities equally warm including distal digits.  NEUROLOGIC: No focal sensory or motor deficits are noted.  PSYCHIATRIC: The patient is awake, alert.  Appropriate mood and affect.  SKIN: Warm, dry, and well perfused. Good turgor.      ED Course, Procedures, & Data       Results for orders placed or performed during the hospital encounter of 11/16/24   US Lower Extremity Venous Duplex Right     Status: None    Narrative    EXAMINATION: DOPPLER VENOUS ULTRASOUND OF THE RIGHT LOWER EXTREMITY,  11/16/2024 5:59 PM     COMPARISON: None.    HISTORY: History DVT new calf pain and swelling evaluate DVT    TECHNIQUE:  Gray-scale evaluation with compression, spectral flow, and  color Doppler assessment of the deep venous system of the right leg  from groin to knee, and then at the ankle.    FINDINGS:  In the right lower extremity, the common femoral, femoral, popliteal  and posterior tibial veins demonstrate normal compressibility and  blood flow. Similar circumferential wall thickening of a peroneal  branch, likely representing chronic " posttraumatic change. Superficial  soft tissue swelling overlying the right medial calf.      Impression    IMPRESSION:  1.  No evidence left lower extremity deep venous thrombosis.   2.  Superficial soft tissue swelling overlying the right medial calf.    I have personally reviewed the examination and initial interpretation  and I agree with the findings.    DORIS CANNON MD         SYSTEM ID:  [id number]     Medications - No data to display  Labs Ordered and Resulted from Time of ED Arrival to Time of ED Departure - No data to display  US Lower Extremity Venous Duplex Right   Final Result   IMPRESSION:   1.  No evidence left lower extremity deep venous thrombosis.    2.  Superficial soft tissue swelling overlying the right medial calf.      I have personally reviewed the examination and initial interpretation   and I agree with the findings.      DORIS CANNON MD            SYSTEM ID:  [id number]             Critical care was not performed.     Medical Decision Making  The patient's presentation was of moderate complexity (an undiagnosed new problem with uncertain prognosis).    The patient's evaluation involved:  ordering and/or review of 1 test(s) in this encounter (see separate area of note for details)    The patient's management necessitated only low risk treatment.    Assessment & Plan    This is a 78-year-old male with history of lower leg swelling and prior DVT not anticoagulated presenting with new onset of right calf pain and swelling starting earlier today prior to arrival in the absence of injury infectious symptoms chest pain dyspnea.  Vitals reassuring on presentation.  Exam shows my evaluation equal bilateral lower extremity edema without findings suggest infection.  I am able to palpate PT pulses 2+ bilaterally, with equal warmth noted in the lower extremities.  Reviewed recent ABIs which are reassuring.  Considered possible DVT with patient's history of DVT not anticoagulation.  Will  initiate right lower extremity ultrasound to further evaluate.  Considered other etiologies as well including infectious etiology patient does not have open wounds or erythema systemic symptoms to suggest cellulitis.  Low suspicion of other cardiopulmonary process to explain symptoms particularly without dyspnea chest pain.  Imaging reviewed, shows no findings for DVT however does show superficial soft tissue swelling.    Discussed these results with the patient.  At this time we discussed recommendations for elevation compression stocking.  Patient has a known history of leg swelling.  Fortunately patient has upcoming appoint primary care later this week.  We discussed importance of follow-up and we discussed strict return precautions.  Patient has no other questions or concerns at this time.  Red flag signs were addressed, and they were in agreement with the patient care plan provided.    Patient seen and discussed with attending physician , who agrees with my plan of care.    I have reviewed the nursing notes. I have reviewed the findings, diagnosis, plan and need for follow up with the patient.    New Prescriptions    No medications on file       Final diagnoses:   Right calf pain       MAY Germain  [facility] EMERGENCY DEPARTMENT  11/16/2024     Fozia Doshi, MIKAELA  11/16/24 1844  -------------------------------------------------------------------------------  --    ED Attending Physician Attestation    I Phoebe Rock MD, cared for this patient with the Advanced Practice Provider (RUSSELL). I personally provided a substantive portion of the care for this patient, including approving the care plan for the number and complexity of problems addressed and taking responsibility related to the risk of complications and/or morbidity or mortality of patient management. Please see the RUSSELL's documentation for full details.    Summary of HPI, PE, ED Course   Patient is a 78 year old  male evaluated in the emergency department for right calf swelling, in the context of previous history of DVT in that leg.  Not currently on anticoagulation.  He has no chest pain or shortness of breath.  No significant tenderness to palpation but has mild swelling.  Of note, he had a previous surgery on that leg and has extensive scarring on the posterior aspect, and has been doing increasing exercises/calf raises to try to strengthen his muscles.  His ultrasound does not show any evidence of DVT.  Patient was reassured and will be instructed to follow-up with primary clinic as needed.  After the completion of care in the emergency department, the patient was discharged.      Phoebe Rock MD  Emergency Medicine          Phoebe Rock MD  11/17/24 0016

## 2024-11-17 NOTE — DISCHARGE INSTRUCTIONS
Here in the emergency room you have reassuring evaluation.  We discussed findings with your ultrasound showing no findings for blood clot in the deep veins of your leg.  You do have chronic swelling in your legs.  We discussed recommendations of elevation, compression stockings, Tylenol as needed for pain.  You should keep your primary care appointment on Wednesday.    If you develop any new or worsening symptoms, is important to return right away to the emergency department for further evaluation and management.

## 2024-11-30 ENCOUNTER — HEALTH MAINTENANCE LETTER (OUTPATIENT)
Age: 79
End: 2024-11-30